# Patient Record
Sex: MALE | Race: WHITE | NOT HISPANIC OR LATINO | URBAN - METROPOLITAN AREA
[De-identification: names, ages, dates, MRNs, and addresses within clinical notes are randomized per-mention and may not be internally consistent; named-entity substitution may affect disease eponyms.]

---

## 2018-05-12 ENCOUNTER — EMERGENCY (EMERGENCY)
Facility: HOSPITAL | Age: 83
LOS: 1 days | Discharge: HOME | End: 2018-05-12
Attending: EMERGENCY MEDICINE | Admitting: EMERGENCY MEDICINE
Payer: COMMERCIAL

## 2018-05-12 VITALS
DIASTOLIC BLOOD PRESSURE: 90 MMHG | HEART RATE: 95 BPM | SYSTOLIC BLOOD PRESSURE: 143 MMHG | RESPIRATION RATE: 16 BRPM | OXYGEN SATURATION: 100 %

## 2018-05-12 VITALS
OXYGEN SATURATION: 98 % | RESPIRATION RATE: 16 BRPM | HEART RATE: 81 BPM | SYSTOLIC BLOOD PRESSURE: 144 MMHG | DIASTOLIC BLOOD PRESSURE: 81 MMHG | TEMPERATURE: 99 F

## 2018-05-12 DIAGNOSIS — M25.561 PAIN IN RIGHT KNEE: ICD-10-CM

## 2018-05-12 DIAGNOSIS — E78.5 HYPERLIPIDEMIA, UNSPECIFIED: ICD-10-CM

## 2018-05-12 DIAGNOSIS — E78.00 PURE HYPERCHOLESTEROLEMIA, UNSPECIFIED: ICD-10-CM
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
## 2018-05-12 DIAGNOSIS — M54.2 CERVICALGIA: ICD-10-CM

## 2018-05-12 DIAGNOSIS — I10 ESSENTIAL (PRIMARY) HYPERTENSION: ICD-10-CM
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
## 2018-05-12 DIAGNOSIS — M25.562 PAIN IN LEFT KNEE: ICD-10-CM

## 2018-05-12 LAB
ALBUMIN SERPL ELPH-MCNC: 2.3 G/DL — LOW (ref 3.4–5)
ALP SERPL-CCNC: 105 U/L — SIGNIFICANT CHANGE UP (ref 40–120)
ALT FLD-CCNC: 30 U/L — SIGNIFICANT CHANGE UP (ref 12–42)
ANION GAP SERPL CALC-SCNC: 11 MMOL/L — SIGNIFICANT CHANGE UP (ref 9–16)
APPEARANCE UR: CLEAR — SIGNIFICANT CHANGE UP
APTT BLD: 34 SEC — SIGNIFICANT CHANGE UP (ref 27.5–36.5)
AST SERPL-CCNC: 21 U/L — SIGNIFICANT CHANGE UP (ref 15–37)
BASOPHILS NFR BLD AUTO: 0.3 % — SIGNIFICANT CHANGE UP (ref 0–2)
BILIRUB SERPL-MCNC: 0.8 MG/DL — SIGNIFICANT CHANGE UP (ref 0.2–1.2)
BILIRUB UR-MCNC: (no result)
BUN SERPL-MCNC: 17 MG/DL — SIGNIFICANT CHANGE UP (ref 7–23)
CALCIUM SERPL-MCNC: 9.8 MG/DL — SIGNIFICANT CHANGE UP (ref 8.5–10.5)
CHLORIDE SERPL-SCNC: 104 MMOL/L — SIGNIFICANT CHANGE UP (ref 96–108)
CO2 SERPL-SCNC: 26 MMOL/L — SIGNIFICANT CHANGE UP (ref 22–31)
COLOR SPEC: YELLOW — SIGNIFICANT CHANGE UP
CREAT SERPL-MCNC: 1.25 MG/DL — SIGNIFICANT CHANGE UP (ref 0.5–1.3)
DIFF PNL FLD: NEGATIVE — SIGNIFICANT CHANGE UP
EOSINOPHIL NFR BLD AUTO: 0.3 % — SIGNIFICANT CHANGE UP (ref 0–6)
GLUCOSE SERPL-MCNC: 134 MG/DL — HIGH (ref 70–99)
GLUCOSE UR QL: NEGATIVE — SIGNIFICANT CHANGE UP
HCT VFR BLD CALC: 38.1 % — LOW (ref 39–50)
HGB BLD-MCNC: 12.8 G/DL — LOW (ref 13–17)
IMM GRANULOCYTES NFR BLD AUTO: 0.7 % — SIGNIFICANT CHANGE UP (ref 0–1.5)
INR BLD: 1.25 — HIGH (ref 0.88–1.16)
KETONES UR-MCNC: 40 MG/DL
LACTATE SERPL-SCNC: 1.7 MMOL/L — SIGNIFICANT CHANGE UP (ref 0.4–2)
LEUKOCYTE ESTERASE UR-ACNC: NEGATIVE — SIGNIFICANT CHANGE UP
LYMPHOCYTES # BLD AUTO: 6.1 % — LOW (ref 13–44)
MAGNESIUM SERPL-MCNC: 2.1 MG/DL — SIGNIFICANT CHANGE UP (ref 1.6–2.6)
MCHC RBC-ENTMCNC: 29.8 PG — SIGNIFICANT CHANGE UP (ref 27–34)
MCHC RBC-ENTMCNC: 33.6 G/DL — SIGNIFICANT CHANGE UP (ref 32–36)
MCV RBC AUTO: 88.8 FL — SIGNIFICANT CHANGE UP (ref 80–100)
MONOCYTES NFR BLD AUTO: 7.5 % — SIGNIFICANT CHANGE UP (ref 2–14)
NEUTROPHILS NFR BLD AUTO: 85.1 % — HIGH (ref 43–77)
NITRITE UR-MCNC: NEGATIVE — SIGNIFICANT CHANGE UP
NT-PROBNP SERPL-SCNC: 2870 PG/ML — HIGH
PH UR: 6 — SIGNIFICANT CHANGE UP (ref 5–8)
PLATELET # BLD AUTO: 394 K/UL — SIGNIFICANT CHANGE UP (ref 150–400)
POTASSIUM SERPL-MCNC: 3.5 MMOL/L — SIGNIFICANT CHANGE UP (ref 3.5–5.3)
POTASSIUM SERPL-SCNC: 3.5 MMOL/L — SIGNIFICANT CHANGE UP (ref 3.5–5.3)
PROT SERPL-MCNC: 7.7 G/DL — SIGNIFICANT CHANGE UP (ref 6.4–8.2)
PROT UR-MCNC: 30 MG/DL
PROTHROM AB SERPL-ACNC: 13.8 SEC — HIGH (ref 9.8–12.7)
RBC # BLD: 4.29 M/UL — SIGNIFICANT CHANGE UP (ref 4.2–5.8)
RBC # FLD: 12.1 % — SIGNIFICANT CHANGE UP (ref 10.3–16.9)
SODIUM SERPL-SCNC: 141 MMOL/L — SIGNIFICANT CHANGE UP (ref 132–145)
SP GR SPEC: 1.02 — SIGNIFICANT CHANGE UP (ref 1–1.03)
TROPONIN I SERPL-MCNC: <0.017 NG/ML — LOW (ref 0.02–0.06)
TSH SERPL-MCNC: 1.14 UIU/ML — SIGNIFICANT CHANGE UP (ref 0.36–3.74)
UROBILINOGEN FLD QL: 4 E.U./DL
WBC # BLD: 13.8 K/UL — HIGH (ref 3.8–10.5)
WBC # FLD AUTO: 13.8 K/UL — HIGH (ref 3.8–10.5)

## 2018-05-12 PROCEDURE — 72125 CT NECK SPINE W/O DYE: CPT | Mod: 26

## 2018-05-12 PROCEDURE — 73521 X-RAY EXAM HIPS BI 2 VIEWS: CPT | Mod: 26

## 2018-05-12 PROCEDURE — 70450 CT HEAD/BRAIN W/O DYE: CPT | Mod: 26

## 2018-05-12 PROCEDURE — 71045 X-RAY EXAM CHEST 1 VIEW: CPT | Mod: 26

## 2018-05-12 PROCEDURE — 73501 X-RAY EXAM HIP UNI 1 VIEW: CPT | Mod: 26,RT

## 2018-05-12 PROCEDURE — 73562 X-RAY EXAM OF KNEE 3: CPT | Mod: 26,RT

## 2018-05-12 PROCEDURE — 99284 EMERGENCY DEPT VISIT MOD MDM: CPT | Mod: 25

## 2018-05-12 PROCEDURE — 73502 X-RAY EXAM HIP UNI 2-3 VIEWS: CPT | Mod: 26,LT

## 2018-05-12 PROCEDURE — 73564 X-RAY EXAM KNEE 4 OR MORE: CPT | Mod: 26,50

## 2018-05-12 RX ORDER — ACETAMINOPHEN 500 MG
975 TABLET ORAL ONCE
Qty: 0 | Refills: 0 | Status: COMPLETED | OUTPATIENT
Start: 2018-05-12 | End: 2018-05-12

## 2018-05-12 RX ORDER — MORPHINE SULFATE 50 MG/1
2 CAPSULE, EXTENDED RELEASE ORAL ONCE
Qty: 0 | Refills: 0 | Status: DISCONTINUED | OUTPATIENT
Start: 2018-05-12 | End: 2018-05-12

## 2018-05-12 NOTE — ED PROVIDER NOTE - PROGRESS NOTE DETAILS
Labs wnl, xray and CT imaging wnl, feeling better, will d/c. Ketones and low albumin discussed with pt's daughter and . tejy are closely followed at [facility]. Will d/c with cane. Patient can ambulate and get in/out of chair on his own. No pain meds required.

## 2018-05-12 NOTE — ED PROVIDER NOTE - OBJECTIVE STATEMENT
84 m with HTN, HLD, worsening dementia NOS here with mechanical fall on we stairs this am. Slipped on outside steps, was on the ground outside int eh rain for 1h, couldn't get up. He lives with his daughter, has one flight of circular stairs to climb to ge to his room/bathroom. His gait has been increasingly unsteady and somnolent x 1 month. This is his first real fall. + b/l knee and hip pain.     His PMD/neuro MD have taken him demential meds as there was a concern that this was causing excess sleepiness. Also barely eating. Has some help at home a few times a week for chores but not for ADLs like bathing.

## 2018-05-12 NOTE — ED PROVIDER NOTE - FAMILY HISTORY
Mother  Still living? No  Family history of stroke, Age at diagnosis: Age Unknown     Father  Still living? No  CAD (coronary atherosclerotic disease), Age at diagnosis: Age Unknown

## 2018-05-12 NOTE — ED PROVIDER NOTE - NEUROLOGICAL, MLM
Alert and oriented to self/place. CRAWFORD, Sleepy, arousable, at time animated. Appears at or close to baseline per daughter.

## 2018-05-12 NOTE — ED PROVIDER NOTE - MEDICAL DECISION MAKING DETAILS
[facility]/knee [facility] b/l. Dn seem to have hit head bit did endorse sa neck pain on ROS. Will check labs with CT head/C-spine and hip/knee imaging.

## 2018-05-12 NOTE — ED PROVIDER NOTE - MUSCULOSKELETAL, MLM
Spine appears normal, b/l L > R hip area pain and b/l knee ttp with slight redness. Exam limited 2/2 dementia and ability to verbalized focal complaints

## 2018-05-12 NOTE — ED PROVIDER NOTE - PMH
Dementia without behavioral disturbance, unspecified dementia type    Hypercholesterolemia    Hypertension

## 2018-05-12 NOTE — ED PROVIDER NOTE - DIAGNOSTIC INTERPRETATION
Interpreted by ED Physician:  Hip L and pelvis xray (3 view)- no fx, no jt effusion, no soft tissue swelling, + DJD  Interpreted by ED Physician:  Hip R xray (2 view)- no fx, no jt effusion, no soft tissue swelling, + DJD  Interpreted by ED Physician:  Knee b/l xray (4 view)- no fx, no jt effusion, no soft tissue swelling, + DJD

## 2018-05-25 ENCOUNTER — EMERGENCY (EMERGENCY)
Facility: HOSPITAL | Age: 83
LOS: 1 days | Discharge: HOME | End: 2018-05-25
Admitting: EMERGENCY MEDICINE
Payer: MEDICARE

## 2018-05-25 VITALS
DIASTOLIC BLOOD PRESSURE: 79 MMHG | SYSTOLIC BLOOD PRESSURE: 106 MMHG | RESPIRATION RATE: 18 BRPM | TEMPERATURE: 99 F | OXYGEN SATURATION: 98 % | HEART RATE: 63 BPM

## 2018-05-25 VITALS
TEMPERATURE: 98 F | RESPIRATION RATE: 18 BRPM | HEART RATE: 62 BPM | SYSTOLIC BLOOD PRESSURE: 105 MMHG | DIASTOLIC BLOOD PRESSURE: 69 MMHG | OXYGEN SATURATION: 96 %

## 2018-05-25 DIAGNOSIS — Z79.899 OTHER LONG TERM (CURRENT) DRUG THERAPY: ICD-10-CM

## 2018-05-25 DIAGNOSIS — R53.1 WEAKNESS: ICD-10-CM

## 2018-05-25 DIAGNOSIS — E78.00 PURE HYPERCHOLESTEROLEMIA, UNSPECIFIED: ICD-10-CM

## 2018-05-25 DIAGNOSIS — M79.1 MYALGIA: ICD-10-CM

## 2018-05-25 DIAGNOSIS — I10 ESSENTIAL (PRIMARY) HYPERTENSION: ICD-10-CM

## 2018-05-25 PROBLEM — F03.90 UNSPECIFIED DEMENTIA WITHOUT BEHAVIORAL DISTURBANCE: Chronic | Status: ACTIVE | Noted: 2018-05-12

## 2018-05-25 LAB
ALBUMIN SERPL ELPH-MCNC: 2.1 G/DL — LOW (ref 3.4–5)
ALP SERPL-CCNC: 127 U/L — HIGH (ref 40–120)
ALT FLD-CCNC: 95 U/L — HIGH (ref 12–42)
ANION GAP SERPL CALC-SCNC: 7 MMOL/L — LOW (ref 9–16)
APPEARANCE UR: CLEAR — SIGNIFICANT CHANGE UP
AST SERPL-CCNC: 60 U/L — HIGH (ref 15–37)
BASOPHILS NFR BLD AUTO: 0.6 % — SIGNIFICANT CHANGE UP (ref 0–2)
BILIRUB SERPL-MCNC: 0.3 MG/DL — SIGNIFICANT CHANGE UP (ref 0.2–1.2)
BILIRUB UR-MCNC: NEGATIVE — SIGNIFICANT CHANGE UP
BUN SERPL-MCNC: 30 MG/DL — HIGH (ref 7–23)
CALCIUM SERPL-MCNC: 9.6 MG/DL — SIGNIFICANT CHANGE UP (ref 8.5–10.5)
CHLORIDE SERPL-SCNC: 103 MMOL/L — SIGNIFICANT CHANGE UP (ref 96–108)
CK SERPL-CCNC: 20 U/L — LOW (ref 39–308)
CO2 SERPL-SCNC: 29 MMOL/L — SIGNIFICANT CHANGE UP (ref 22–31)
COLOR SPEC: YELLOW — SIGNIFICANT CHANGE UP
CREAT SERPL-MCNC: 1.25 MG/DL — SIGNIFICANT CHANGE UP (ref 0.5–1.3)
DIFF PNL FLD: (no result)
EOSINOPHIL NFR BLD AUTO: 1.7 % — SIGNIFICANT CHANGE UP (ref 0–6)
GLUCOSE SERPL-MCNC: 125 MG/DL — HIGH (ref 70–99)
GLUCOSE UR QL: NEGATIVE — SIGNIFICANT CHANGE UP
HCT VFR BLD CALC: 38 % — LOW (ref 39–50)
HGB BLD-MCNC: 12.2 G/DL — LOW (ref 13–17)
IMM GRANULOCYTES NFR BLD AUTO: 0.5 % — SIGNIFICANT CHANGE UP (ref 0–1.5)
INR BLD: 1.22 — HIGH (ref 0.88–1.16)
KETONES UR-MCNC: NEGATIVE — SIGNIFICANT CHANGE UP
LACTATE SERPL-SCNC: 1.3 MMOL/L — SIGNIFICANT CHANGE UP (ref 0.4–2)
LEUKOCYTE ESTERASE UR-ACNC: NEGATIVE — SIGNIFICANT CHANGE UP
LYMPHOCYTES # BLD AUTO: 14.4 % — SIGNIFICANT CHANGE UP (ref 13–44)
MAGNESIUM SERPL-MCNC: 2.6 MG/DL — SIGNIFICANT CHANGE UP (ref 1.6–2.6)
MCHC RBC-ENTMCNC: 29.1 PG — SIGNIFICANT CHANGE UP (ref 27–34)
MCHC RBC-ENTMCNC: 32.1 G/DL — SIGNIFICANT CHANGE UP (ref 32–36)
MCV RBC AUTO: 90.7 FL — SIGNIFICANT CHANGE UP (ref 80–100)
MONOCYTES NFR BLD AUTO: 9.6 % — SIGNIFICANT CHANGE UP (ref 2–14)
NEUTROPHILS NFR BLD AUTO: 73.2 % — SIGNIFICANT CHANGE UP (ref 43–77)
NITRITE UR-MCNC: NEGATIVE — SIGNIFICANT CHANGE UP
NT-PROBNP SERPL-SCNC: 1802 PG/ML — HIGH
PH UR: 6.5 — SIGNIFICANT CHANGE UP (ref 5–8)
PLATELET # BLD AUTO: 421 K/UL — HIGH (ref 150–400)
POTASSIUM SERPL-MCNC: 4.6 MMOL/L — SIGNIFICANT CHANGE UP (ref 3.5–5.3)
POTASSIUM SERPL-SCNC: 4.6 MMOL/L — SIGNIFICANT CHANGE UP (ref 3.5–5.3)
PROT SERPL-MCNC: 7.9 G/DL — SIGNIFICANT CHANGE UP (ref 6.4–8.2)
PROT UR-MCNC: (no result) MG/DL
PROTHROM AB SERPL-ACNC: 13.5 SEC — HIGH (ref 9.8–12.7)
RBC # BLD: 4.19 M/UL — LOW (ref 4.2–5.8)
RBC # FLD: 12.4 % — SIGNIFICANT CHANGE UP (ref 10.3–16.9)
SODIUM SERPL-SCNC: 139 MMOL/L — SIGNIFICANT CHANGE UP (ref 132–145)
SP GR SPEC: 1.01 — SIGNIFICANT CHANGE UP (ref 1–1.03)
TROPONIN I SERPL-MCNC: <0.017 NG/ML — LOW (ref 0.02–0.06)
TSH SERPL-MCNC: 2.44 UIU/ML — SIGNIFICANT CHANGE UP (ref 0.36–3.74)
UROBILINOGEN FLD QL: 0.2 E.U./DL — SIGNIFICANT CHANGE UP
WBC # BLD: 10.7 K/UL — HIGH (ref 3.8–10.5)
WBC # FLD AUTO: 10.7 K/UL — HIGH (ref 3.8–10.5)

## 2018-05-25 PROCEDURE — 71045 X-RAY EXAM CHEST 1 VIEW: CPT | Mod: 26

## 2018-05-25 PROCEDURE — 71046 X-RAY EXAM CHEST 2 VIEWS: CPT | Mod: 26

## 2018-05-25 PROCEDURE — 99285 EMERGENCY DEPT VISIT HI MDM: CPT

## 2018-05-25 RX ORDER — SODIUM CHLORIDE 9 MG/ML
3 INJECTION INTRAMUSCULAR; INTRAVENOUS; SUBCUTANEOUS ONCE
Qty: 0 | Refills: 0 | Status: COMPLETED | OUTPATIENT
Start: 2018-05-25 | End: 2018-05-25

## 2018-05-25 RX ORDER — SODIUM CHLORIDE 9 MG/ML
1000 INJECTION INTRAMUSCULAR; INTRAVENOUS; SUBCUTANEOUS ONCE
Qty: 0 | Refills: 0 | Status: COMPLETED | OUTPATIENT
Start: 2018-05-25 | End: 2018-05-25

## 2018-05-25 RX ADMIN — SODIUM CHLORIDE 1000 MILLILITER(S): 9 INJECTION INTRAMUSCULAR; INTRAVENOUS; SUBCUTANEOUS at 19:19

## 2018-05-25 NOTE — ED PROVIDER NOTE - PSYCHIATRIC, MLM
Alert and oriented to person, place, time, disoriented to situation. normal mood and affect. no apparent risk to self or others.

## 2018-05-25 NOTE — ED PROVIDER NOTE - NEUROLOGICAL LEVEL OF CONSCIOUSNESS
alert, oriented x 3, follows commands.  pt disoriented to circumstance, moves all 4 extremities independently

## 2018-05-25 NOTE — ED ADULT TRIAGE NOTE - CHIEF COMPLAINT QUOTE
Pt. presents with weakness and back pain. As per daughter pt has been declining in health since a fall 2 weeks ago. Pt. refusing to eat, leave the house, or ambulate. Daughter states their  is in the process of trying to find him placement in an assisted living.

## 2018-05-25 NOTE — ED PROVIDER NOTE - OBJECTIVE STATEMENT
85 y/o M w/ PMH HTN, high cholesterol, Dementia, BIB EMS accompanied by daughter and care manager, who presents w/ generalized weakness x2 weeks. According to the daughter, patient is normally able to walk the dog, go grocery shopping and to the senior center 2-3 weeks ago, but over the past couple of weeks, he has progressively declined in health, becoming too weak to get up and do normal activities. He has close follow up at [facility] and had his dose of donepezil and Namenda discontinued. Daughter states the weakness has progressively worsened with decrease eating/drinking, and patient is refusing to get up out of bed. Today, patient had passed stool twice while in bed and did not care to get up and clean himself but just laid in bed covered in feces. Daughter states he did have a mechanical fall 2 weeks ago after slipping on wet stairs and the patient has been afraid to leave the house, fearful he will fall again. Denies fever, chills, chest pain, shortness of breath, abdominal pain, nausea, vomiting, diarrhea, headache, dizziness, or any other complaints. 85 y/o M w/ PMH HTN, high cholesterol, Dementia, BIB EMS accompanied by daughter and care manager, who presents w/ generalized weakness x2 weeks. According to the daughter, patient is normally able to walk the dog, go grocery shopping and to the senior center 2-3 weeks ago, but over the past couple of weeks, he has progressively declined in health, becoming too weak to get up and do normal activities. He has close follow up at [facility] and had his dose of donepezil and Namenda discontinued. Daughter states the weakness has progressively worsened with decrease eating/drinking, and patient is refusing to get up out of bed. Today, patient had passed stool twice while in bed and did not care to get up and clean himself but just laid in bed covered in feces. Daughter states he did have a mechanical fall 2 weeks ago after slipping on wet stairs and the patient has been afraid to leave the house, fearful he will fall again. Denies fever, chills, chest pain, shortness of breath, abdominal pain, nausea, vomiting, diarrhea, headache, dizziness, or any other complaints.    After further questioning, daughter states he has history of poor hygiene and "peculiar" behavior for as long as she can remember.  She describes he would often not shower or change his clothing for a month at a time and often leaves food at his bedside to decay/rot.  His tendencies became more pronounced after her mother  approx 2-3 years ago.

## 2018-05-31 LAB
CULTURE RESULTS: SIGNIFICANT CHANGE UP
SPECIMEN SOURCE: SIGNIFICANT CHANGE UP

## 2018-06-07 ENCOUNTER — INPATIENT (INPATIENT)
Facility: HOSPITAL | Age: 83
LOS: 4 days | Discharge: NURSING, REHAB OR LONG-TERM CARE | DRG: 56 | End: 2018-06-12
Attending: STUDENT IN AN ORGANIZED HEALTH CARE EDUCATION/TRAINING PROGRAM | Admitting: STUDENT IN AN ORGANIZED HEALTH CARE EDUCATION/TRAINING PROGRAM
Payer: MEDICARE

## 2018-06-07 VITALS
DIASTOLIC BLOOD PRESSURE: 81 MMHG | RESPIRATION RATE: 20 BRPM | TEMPERATURE: 98 F | SYSTOLIC BLOOD PRESSURE: 112 MMHG | OXYGEN SATURATION: 98 % | HEART RATE: 78 BPM

## 2018-06-07 LAB
ALBUMIN SERPL ELPH-MCNC: 1.9 G/DL — LOW (ref 3.4–5)
ALP SERPL-CCNC: 107 U/L — SIGNIFICANT CHANGE UP (ref 40–120)
ALT FLD-CCNC: 50 U/L — HIGH (ref 12–42)
ANION GAP SERPL CALC-SCNC: 7 MMOL/L — LOW (ref 9–16)
APPEARANCE UR: CLEAR — SIGNIFICANT CHANGE UP
AST SERPL-CCNC: 30 U/L — SIGNIFICANT CHANGE UP (ref 15–37)
BASOPHILS NFR BLD AUTO: 0.5 % — SIGNIFICANT CHANGE UP (ref 0–2)
BILIRUB SERPL-MCNC: 0.2 MG/DL — SIGNIFICANT CHANGE UP (ref 0.2–1.2)
BILIRUB UR-MCNC: NEGATIVE — SIGNIFICANT CHANGE UP
BUN SERPL-MCNC: 29 MG/DL — HIGH (ref 7–23)
CALCIUM SERPL-MCNC: 9.1 MG/DL — SIGNIFICANT CHANGE UP (ref 8.5–10.5)
CHLORIDE SERPL-SCNC: 102 MMOL/L — SIGNIFICANT CHANGE UP (ref 96–108)
CO2 SERPL-SCNC: 27 MMOL/L — SIGNIFICANT CHANGE UP (ref 22–31)
COLOR SPEC: YELLOW — SIGNIFICANT CHANGE UP
CREAT SERPL-MCNC: 1.23 MG/DL — SIGNIFICANT CHANGE UP (ref 0.5–1.3)
DIFF PNL FLD: ABNORMAL
EOSINOPHIL NFR BLD AUTO: 3 % — SIGNIFICANT CHANGE UP (ref 0–6)
GLUCOSE SERPL-MCNC: 170 MG/DL — HIGH (ref 70–99)
GLUCOSE UR QL: NEGATIVE — SIGNIFICANT CHANGE UP
HCT VFR BLD CALC: 35.8 % — LOW (ref 39–50)
HGB BLD-MCNC: 11.5 G/DL — LOW (ref 13–17)
IMM GRANULOCYTES NFR BLD AUTO: 0.7 % — SIGNIFICANT CHANGE UP (ref 0–1.5)
KETONES UR-MCNC: NEGATIVE — SIGNIFICANT CHANGE UP
LACTATE SERPL-SCNC: 1.8 MMOL/L — SIGNIFICANT CHANGE UP (ref 0.4–2)
LEUKOCYTE ESTERASE UR-ACNC: NEGATIVE — SIGNIFICANT CHANGE UP
LYMPHOCYTES # BLD AUTO: 15.3 % — SIGNIFICANT CHANGE UP (ref 13–44)
MAGNESIUM SERPL-MCNC: 2.1 MG/DL — SIGNIFICANT CHANGE UP (ref 1.6–2.6)
MCHC RBC-ENTMCNC: 29.1 PG — SIGNIFICANT CHANGE UP (ref 27–34)
MCHC RBC-ENTMCNC: 32.1 G/DL — SIGNIFICANT CHANGE UP (ref 32–36)
MCV RBC AUTO: 90.6 FL — SIGNIFICANT CHANGE UP (ref 80–100)
MONOCYTES NFR BLD AUTO: 9.9 % — SIGNIFICANT CHANGE UP (ref 2–14)
NEUTROPHILS NFR BLD AUTO: 70.6 % — SIGNIFICANT CHANGE UP (ref 43–77)
NITRITE UR-MCNC: NEGATIVE — SIGNIFICANT CHANGE UP
PH UR: 6 — SIGNIFICANT CHANGE UP (ref 5–8)
PLATELET # BLD AUTO: 398 K/UL — SIGNIFICANT CHANGE UP (ref 150–400)
POTASSIUM SERPL-MCNC: 3.8 MMOL/L — SIGNIFICANT CHANGE UP (ref 3.5–5.3)
POTASSIUM SERPL-SCNC: 3.8 MMOL/L — SIGNIFICANT CHANGE UP (ref 3.5–5.3)
PROT SERPL-MCNC: 7.3 G/DL — SIGNIFICANT CHANGE UP (ref 6.4–8.2)
PROT UR-MCNC: NEGATIVE MG/DL — SIGNIFICANT CHANGE UP
RBC # BLD: 3.95 M/UL — LOW (ref 4.2–5.8)
RBC # FLD: 12.9 % — SIGNIFICANT CHANGE UP (ref 10.3–16.9)
SODIUM SERPL-SCNC: 136 MMOL/L — SIGNIFICANT CHANGE UP (ref 132–145)
SP GR SPEC: 1.02 — SIGNIFICANT CHANGE UP (ref 1–1.03)
TROPONIN I SERPL-MCNC: <0.017 NG/ML — LOW (ref 0.02–0.06)
UROBILINOGEN FLD QL: 0.2 E.U./DL — SIGNIFICANT CHANGE UP
WBC # BLD: 10.4 K/UL — SIGNIFICANT CHANGE UP (ref 3.8–10.5)
WBC # FLD AUTO: 10.4 K/UL — SIGNIFICANT CHANGE UP (ref 3.8–10.5)

## 2018-06-07 PROCEDURE — 71045 X-RAY EXAM CHEST 1 VIEW: CPT | Mod: 26

## 2018-06-07 PROCEDURE — 74177 CT ABD & PELVIS W/CONTRAST: CPT | Mod: 26

## 2018-06-07 PROCEDURE — 99223 1ST HOSP IP/OBS HIGH 75: CPT | Mod: GC

## 2018-06-07 PROCEDURE — 99285 EMERGENCY DEPT VISIT HI MDM: CPT

## 2018-06-07 PROCEDURE — 70450 CT HEAD/BRAIN W/O DYE: CPT | Mod: 26

## 2018-06-07 RX ORDER — SODIUM CHLORIDE 9 MG/ML
1000 INJECTION INTRAMUSCULAR; INTRAVENOUS; SUBCUTANEOUS
Qty: 0 | Refills: 0 | Status: DISCONTINUED | OUTPATIENT
Start: 2018-06-07 | End: 2018-06-07

## 2018-06-07 RX ORDER — SODIUM CHLORIDE 9 MG/ML
1000 INJECTION INTRAMUSCULAR; INTRAVENOUS; SUBCUTANEOUS
Qty: 0 | Refills: 0 | Status: DISCONTINUED | OUTPATIENT
Start: 2018-06-07 | End: 2018-06-08

## 2018-06-07 RX ORDER — HEPARIN SODIUM 5000 [USP'U]/ML
5000 INJECTION INTRAVENOUS; SUBCUTANEOUS EVERY 8 HOURS
Qty: 0 | Refills: 0 | Status: DISCONTINUED | OUTPATIENT
Start: 2018-06-07 | End: 2018-06-08

## 2018-06-07 RX ADMIN — SODIUM CHLORIDE 150 MILLILITER(S): 9 INJECTION INTRAMUSCULAR; INTRAVENOUS; SUBCUTANEOUS at 20:11

## 2018-06-07 NOTE — ED PROVIDER NOTE - MEDICAL DECISION MAKING DETAILS
85 y/o pt brought from home, hypotensive, 70/50 normal, tensive on arrival, seen in ED twice in the past 3 weeks, declining heatlh, failure to thrive and progressive weakness. Pt effectively bed bound for last week. 2 ED visits and has been seen by home , chronically no vital signs derangements, low grade fever, chronically ill appearing. Chest XR, blood work, labs, UA, CT head, EKG. 85 y/o pt brought from home, hypotensive, 70/50 normal, tensive on arrival, seen in ED twice in the past 3 weeks, declining health, failure to thrive and progressive weakness. Pt effectively bed bound for last week. 2 ED visits and has been seen by home , chronically no vital signs derangements, low grade fever, chronically ill appearing. Chest XR, blood work, labs, UA, CT head, EKG.  Hx of afib, HTN.  Accompanied by brother and sister in law

## 2018-06-07 NOTE — PATIENT PROFILE ADULT. - NSNUTRITIONRISK_GI_A_CORE
Unintentional weight loss greater than 10%/Significant decrease of oral intake greater than 5 days prior to admission

## 2018-06-07 NOTE — ED PROVIDER NOTE - NEUROLOGICAL, MLM
Alert and oriented, MAEx2, follows commands appropriately. No facial asymmetry. Alert and oriented, MAEx2, follows commands appropriately. No facial asymmetry.  Difficult to move and to asses truncal ataxia or gait.

## 2018-06-07 NOTE — ED ADULT NURSE REASSESSMENT NOTE - NS ED NURSE REASSESS COMMENT FT1
Pt received from ER [facility]. Pt is A&Ox1 at this time. Pt denies pain at this time and exhibiting no s.s of acute distress. Pt has romero in place draining dark yellow urine. Pt is pending transfer at this time. Pt stable at this time and will continue to monitor.

## 2018-06-07 NOTE — H&P ADULT - ATTENDING COMMENTS
Pt seen and examined at bedside on 6/7/2018 @ 2350    Agree with HPI, ROS as above.     VS, Labs, FH, SH, allergies, medications, imaging reviewed. I personally discussed this case with Dr. Burt ([facility] provider) - patient with multiple ED visits recently, unable to take care of self. I personally reviewed the CXR - no overt infiltrate or consolidation. Agree with physical exam as above     A/P: 83 y/o M PMHx HTN, HLD, dementia, presents with complaints of weakness, lethargy, progressive mental decline x1 month.    **Toxic Metabolic Encephalopathy  -Unclear etiology although likely 2/2 progressive decline within the setting of long standing dementia  -Differential includes Wernicke's given patient's extensive EtOH history - will tx empirically with high dose thiamine 500 q8 x 3 days  -No overt infectious etiology  -CT head with no acute intracranial pathology  -Neuro consult in AM    Plan otherwise as outlined above..... Pt seen and examined at bedside on 6/7/2018 @ 2350    Agree with HPI, ROS as above.     VS, Labs, FH, SH, allergies, medications, imaging reviewed. I personally discussed this case with Dr. Burt ([facility] provider) - patient with multiple ED visits recently, unable to take care of self. I personally reviewed the CXR - no overt infiltrate or consolidation. Agree with physical exam as above     A/P: 85 y/o M PMHx HTN, HLD, dementia, presents with complaints of weakness, lethargy, progressive mental decline x1 month.    **Toxic Metabolic Encephalopathy  -Unclear etiology although likely 2/2 progressive decline within the setting of long standing dementia  -Differential includes Wernicke's given patient's extensive EtOH history - will tx empirically with high dose thiamine 500 q8 x 3 days  -No overt infectious etiology  -CT head with no acute intracranial pathology  -Neuro consult in AM  -Check for reversible causes, TSH, b12, RPR    Plan otherwise as outlined above.....

## 2018-06-07 NOTE — ED PROVIDER NOTE - CARDIAC, MLM
Normal rate, regular rhythm.  Heart sounds S1, S2.  No murmurs, rubs or gallops. irregularly irregular,  No murmurs, rubs or gallops.

## 2018-06-07 NOTE — H&P ADULT - HISTORY OF PRESENT ILLNESS
HPI: 85 y/o M PMHx HTN, HLD, a.fib, dementia, presents with complaints of weakness. Per brother at bedside, patient has had dementia for 3 years and as of 1 year ago has been considerably more eccentric. 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 Patient has had 3 mechanical falls over the past 3 months, in January he slipped on ice and most recently 5/11 he slipped on the wet ground. He has previously been able to walk the dog, not really take care of himself in terms of ADLs, but his daughter lives 2 stories above him and patient lives on the 2nd floor of a walk-up. Since the most recent fall, patient has been afraid of walking outside in fear of falling, has been more odd and having "delusions" per the brother such as having a conversation then trailing off and not making sense. He has been increasingly agitated as well and has been difficult to re-orient but seems to respond to the brother. Patient has also had worsening PO over the past month and needs to be encouraged to eat or drink anything. He mostly sleeps all day. Lost 30lbs over 6 weeks. He has been stooling himself and not bothering to clean himself or ask for help. Per his  doctor and psychiatrist, this is most likely progression of vascular dementia. He has been worked up extensively outpatient and including in our ED for various etiologies. Difficult to obtain ROS as patient is tangential and nonsensical despite following some commands. Per brother, does not know home medications was previously on statin, also was on 2 medications (possibly donepezil/memantine that was stopped 1 week ago), med rec also lists valsartan.    ED vitals:  T(F): 98.9, Max: 99.7  HR: 87 (78 - 89)  BP: 134/75 (112/81 - 148/75)  RR: 18 (18 - 20)  SpO2: 97% (96% - 98%)    Labs Hgb 11.2, previously 12.8 -> 12.5. Cr 1.23. UA negative. Blood culture from 2 days ago normal.  CXR no infiltrates or effusions. CT abdomen/pelvis w. IV contrast without acute etiologies.  NS 150cc/hr HPI: 83 y/o M PMHx HTN, HLD, a.fib (unclear if new or old), dementia, presents with complaints of weakness. Per brother at bedside, patient has had dementia for 3 years and as of 1 year ago has been considerably more eccentric. 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 Patient has had 3 mechanical falls over the past 3 months, in January he slipped on ice and most recently 5/11 he slipped on the wet ground. He has previously been able to walk the dog, not really take care of himself in terms of ADLs, but his daughter lives 2 stories above him and patient lives on the 2nd floor of a walk-up. Since the most recent fall, patient has been afraid of walking outside in fear of falling, has been more odd and having "delusions" per the brother such as having a conversation then trailing off and not making sense. He has been increasingly agitated as well and has been difficult to re-orient but seems to respond to the brother. Patient has also had worsening PO over the past month and needs to be encouraged to eat or drink anything. He mostly sleeps all day. Lost 30lbs over 6 weeks. He has been stooling himself and not bothering to clean himself or ask for help. Per his  doctor and psychiatrist, this is most likely progression of vascular dementia. He has been worked up extensively outpatient and including in our ED for various etiologies. Difficult to obtain ROS as patient is tangential and nonsensical despite following some commands. Per brother, does not know home medications was previously on statin, also was on 2 medications (possibly donepezil/memantine that was stopped 1 week ago), med rec also lists valsartan.    ED vitals:  T(F): 98.9, Max: 99.7  HR: 87 (78 - 89)  BP: 134/75 (112/81 - 148/75)  RR: 18 (18 - 20)  SpO2: 97% (96% - 98%)    Labs Hgb 11.2, previously 12.8 -> 12.5. Cr 1.23. UA negative. Blood culture from 2 days ago normal.  CXR no infiltrates or effusions. CT abdomen/pelvis w. IV contrast without acute etiologies.  NS 150cc/hr HPI: 85 y/o M PMHx HTN, HLD, dementia, presents with complaints of weakness. Per brother at bedside, patient has had dementia for 3 years and as of 1 year ago has been considerably more eccentric. 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 Patient has had 3 mechanical falls over the past 3 months, in January he slipped on ice and most recently 5/11 he slipped on the wet ground. He has previously been able to walk the dog, not really take care of himself in terms of ADLs, but his daughter lives 2 stories above him and patient lives on the 2nd floor of a walk-up. Since the most recent fall, patient has been afraid of walking outside in fear of falling, has been more odd and having "delusions" per the brother such as having a conversation then trailing off and not making sense. He has been increasingly agitated as well and has been difficult to re-orient but seems to respond to the brother. Patient has also had worsening PO over the past month and needs to be encouraged to eat or drink anything. He mostly sleeps all day. Lost 30lbs over 6 weeks. He has been stooling himself and not bothering to clean himself or ask for help. Per his  doctor and psychiatrist, this is most likely progression of vascular dementia. He has been worked up extensively outpatient and including in our ED for various etiologies. Difficult to obtain ROS as patient is tangential and nonsensical despite following some commands. Per brother, does not know home medications was previously on statin, also was on 2 medications (possibly donepezil/memantine that was stopped 1 week ago), med rec also lists valsartan.    ED vitals:  T(F): 98.9, Max: 99.7  HR: 87 (78 - 89)  BP: 134/75 (112/81 - 148/75)  RR: 18 (18 - 20)  SpO2: 97% (96% - 98%)    Labs Hgb 11.2, previously 12.8 -> 12.5. Cr 1.23. UA negative. Blood culture from 2 days ago normal.  CXR no infiltrates or effusions. CT abdomen/pelvis w. IV contrast without acute etiologies.  NS 150cc/hr HPI: 85 y/o M PMHx HTN, HLD, dementia, alcohol use disorder, presents with complaints of weakness. Per brother at bedside, patient has had dementia for 3 years and as of 1 year ago has been considerably more eccentric. 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 Patient has had 3 mechanical falls over the past 3 months, in January he slipped on ice and most recently 5/11 he slipped on the wet ground. He has previously been able to walk the dog, not really take care of himself in terms of ADLs, but his daughter lives 2 stories above him and patient lives on the 2nd floor of a walk-up. Since the most recent fall, patient has been afraid of walking outside in fear of falling, has been more odd and having "delusions" per the brother such as having a conversation then trailing off and not making sense. He has been increasingly agitated as well and has been difficult to re-orient but seems to respond to the brother. Patient has also had worsening PO over the past month and needs to be encouraged to eat or drink anything. He mostly sleeps all day. Lost 30lbs over 6 weeks. He has been stooling himself and not bothering to clean himself or ask for help. Pt used to drink heavily per the brother, last drink was 3 weeks ago. Per his  doctor and psychiatrist, this is most likely progression of vascular dementia. He has been worked up extensively outpatient and including in our ED for various etiologies. Difficult to obtain ROS as patient is tangential and nonsensical despite following some commands. Per brother, does not know home medications was previously on statin, also was on 2 medications (possibly donepezil/memantine that was stopped 1 week ago), med rec also lists valsartan.    ED vitals:  T(F): 98.9, Max: 99.7  HR: 87 (78 - 89)  BP: 134/75 (112/81 - 148/75)  RR: 18 (18 - 20)  SpO2: 97% (96% - 98%)    Labs Hgb 11.2, previously 12.8 -> 12.5. Cr 1.23. UA negative. Blood culture from 2 days ago normal.  CXR no infiltrates or effusions. CT abdomen/pelvis w. IV contrast without acute etiologies.  NS 150cc/hr HPI: 83 y/o M PMHx HTN, HLD, dementia, presents with complaints of weakness. Per brother at bedside, patient has had dementia for 3 years and as of 1 year ago has been considerably more eccentric. 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 Patient has had 3 mechanical falls over the past 3 months, in January he slipped on ice and most recently 5/11 he slipped on the wet ground. He has previously been able to walk the dog, not really take care of himself in terms of ADLs, but his daughter lives 2 stories above him and patient lives on the 2nd floor of a walk-up. Since the most recent fall, patient has been afraid of walking outside in fear of falling, has been more odd per the brother such as having a conversation then trailing off and not making sense. He has been increasingly agitated as well and has been difficult to re-orient but seems to respond to the brother. Patient has also had worsening PO over the past month and needs to be encouraged to eat or drink anything. He mostly sleeps all day. Lost 30lbs over 6 weeks. He has been stooling himself and not bothering to clean himself or ask for help. Pt used to drink heavily per the brother, last drink was 3 weeks ago. Per his  doctor and psychiatrist, this is most likely progression of vascular dementia. He has been worked up extensively outpatient and including in our ED for various etiologies. Difficult to obtain ROS as patient is tangential and nonsensical despite following some commands. Per brother, does not know home medications was previously on statin, also was on 2 medications (possibly donepezil/memantine that was stopped 1 week ago), med rec also lists valsartan.    ED vitals:  T(F): 98.9, Max: 99.7  HR: 87 (78 - 89)  BP: 134/75 (112/81 - 148/75)  RR: 18 (18 - 20)  SpO2: 97% (96% - 98%)    Labs Hgb 11.2, previously 12.8 -> 12.5. Cr 1.23. UA negative. Blood culture from 2 days ago normal.  CXR no infiltrates or effusions. CT abdomen/pelvis w. IV contrast without acute etiologies.  NS 150cc/hr

## 2018-06-07 NOTE — ED PROVIDER NOTE - GASTROINTESTINAL, MLM
abd umbilical hernia slightly discolor, soft but non tender, unable to fully reduce, LLQ tenderness, no distension or guarding. umbilical hernia slightly discolored, soft but non tender, unable to fully reduce, LLQ tenderness, no distension or guarding.

## 2018-06-07 NOTE — H&P ADULT - ASSESSMENT
85 y/o M PMHx HTN, HLD, a.fib, dementia, presents with complaints of weakness, lethargy, progressive mental decline x1 month. 85 y/o M PMHx HTN, HLD, dementia, presents with complaints of weakness, lethargy, progressive mental decline x1 month.

## 2018-06-07 NOTE — H&P ADULT - NSHPSOCIALHISTORY_GEN_ALL_CORE
Previous smoker. Previously heavy alcohol drinker, last drink 3 weeks ago per brother. No illicit drug use. Previous smoker. Previously heavy alcohol drinker, last drink 3 weeks ago per brother, but no longer drinking for many months. No illicit drug use.

## 2018-06-07 NOTE — ED PROVIDER NOTE - PROGRESS NOTE DETAILS
Reviewed labs, imaging, urine.  NO evidence of infection.  Cultures pending.  CT brain unchanged.  CT abdomen - no evidence of bowel obstruction or bowel containing hernia.  Hernia with fat, no bowel.  Mental status unchanged.  No evidence of infection in urine or chest.  Lytes unremarkable.  ON maintenance fluids.  Will admit for failure to thrive, will likely need placement.  Discussed in detail with family.  Spoke with HUY and Dr. Land.  Admit under Dr. Gambino.  Hx of afib without RVR.

## 2018-06-07 NOTE — ED PROVIDER NOTE - CONSTITUTIONAL, MLM
normal... Chronically ill appearing, moaning in pain. Chronically ill appearing, moaning in pain, deconditioned

## 2018-06-07 NOTE — ED PROVIDER NOTE - OBJECTIVE STATEMENT
83 y/o M CUBA presents to ED w/ c/o weakness and lethargy. Pt has been in this ER x2 in past 3 weeks for failure to thrive, deep conditioning and declining health. Reviewed ED visits from may 12th and may 25th, blood culture negative, CT scan of brain moderate short vessel disease. Pt accompanied by brother and sister in law, brother is power of . Pts PCP is bre Torres dr, spoke w/ her, states she has only known pt for a few weeks, few home visits, states that recent testing of urine, blood, and XR were all within normal limits. Pt also seen by psychiatrist Alexis Huddleston. 85 y/o M CUBA presents to ED w/ c/o weakness and lethargy. Pt has been in this ER x2 in past 3 weeks for failure to thrive, deep conditioning and declining health. Reviewed ED visits from may 12th and may 25th, blood culture negative, CT scan of brain moderate short vessel disease. Pt accompanied by brother and sister in law, brother is power of . Pts PCP is Dr Shea Goodrich, bre [facility], spoke w/ her, states she has only known pt for a few weeks, few home visits, states that recent testing of urine, blood, and XR were all within normal limits. Pt also seen by psychiatrist Alexis Huddleston..  Hx of Afib

## 2018-06-07 NOTE — ED ADULT TRIAGE NOTE - CHIEF COMPLAINT QUOTE
Pt complaining of fall one week ago and now family states patient is weak. Pt has appointment with psychiatrist at [facility] for 4pm today

## 2018-06-07 NOTE — ED ADULT NURSE NOTE - CHIEF COMPLAINT QUOTE
Pt complaining of fall one week ago and now family states patient is weak. Pt has appointment with psychiatrist at [facility] for 4pm today

## 2018-06-07 NOTE — ED PROVIDER NOTE - CROS ED SKIN ALL NEG
LM for pt to call back. Why does he need refill of nystatin?    negative... radiology results/need for outpatient follow-up

## 2018-06-07 NOTE — ED ADULT NURSE NOTE - OBJECTIVE STATEMENT
2-3 wks completely bedridden; hx of dementia; has been delusional; hasn't been eating; oriented to self and place; psychiatrist wants a full neurological workup at [facility]; called 911; was going to be taken to [facility] but in the ambulance pressure became 70/40; pt is an 84 year old male who comes was [facility] for medical evaluation. pt accompanied by brother who states "for 2-3 wks he has been completely bedridden; he has dementia and has been delusional, hasn't been eating and his psychiatrist wants a full neurological workup at [facility]; we called 911 for him to be taken to [facility] but in the ambulance pressure became 70/40 so they brought him here". pt is alert and oriented to self and place. pt reports generalized pain. EKG was done, IV placed, labs sent. pt family denies nausea, vomiting, diarrhea, fevers, chills. pt family reports increasing confusion and lethargy.

## 2018-06-07 NOTE — H&P ADULT - NSHPPHYSICALEXAM_GEN_ALL_CORE
VITAL SIGNS:  T(F): 98.9 (06-07-18 @ 22:57)  HR: 87 (06-07-18 @ 22:57)  BP: 134/75 (06-07-18 @ 22:57)  RR: 18 (06-07-18 @ 22:57)  SpO2: 97% (06-07-18 @ 22:57)  Wt(kg): --    PHYSICAL EXAM:    Constitutional: well-appearing, well-developed, mildly dry appearing, NAD  Eyes: PERRL, EOMI, no nystagmus  ENMT: dry MM  Neck: supple  Respiratory: CTAb/l, no wheezes/rales/rhonchi  Cardiovascular: irregular rhythm, regular rate, +3/6 systolic murmur at the LUSB,   Gastrointestinal: soft, NTND, BS normal, +umbilical hernia that is reducible  Extremities: WWP no edema or cyanosis  Vascular: DP 2+ b/l  Neurological: AAOx2, CNII-XII grossly intact, Strength 3+/5 b/l hand , 4/5 b/l biceps, 3/5 b/l triceps; 3+/5 b/l hip flexors, 4/5 b/l leg flexors or extensors, 5/5 b/l dorsi and plantar flexion. Sensation normal to light touch b/l. Reflexes brachial 2+ b/l, brachioradialis 1+ b/l, patellar 2+ b/l, achilles 1+ b/l, plantars withdraws b/l  Musculoskeletal: VITAL SIGNS:  T(F): 98.9 (06-07-18 @ 22:57)  HR: 87 (06-07-18 @ 22:57)  BP: 134/75 (06-07-18 @ 22:57)  RR: 18 (06-07-18 @ 22:57)  SpO2: 97% (06-07-18 @ 22:57)  Wt(kg): --    PHYSICAL EXAM:    Constitutional: well-appearing, well-developed, mildly dry appearing, NAD  Eyes: PERRL, EOMI, no nystagmus  ENMT: dry MM  Neck: supple  Respiratory: CTAb/l, no wheezes/rales/rhonchi  Cardiovascular: irregular rhythm, regular rate, +3/6 systolic murmur at the LUSB,   Gastrointestinal: soft, NTND, BS normal, +umbilical hernia that is reducible  Rectal: brown stool, rectal tone difficult to assess as not sure if patient is following commands  Extremities: WWP no edema or cyanosis  Vascular: DP 2+ b/l  Back: non-tender, no stepoffs  Neurological: AAOx2, Follows most commands. CNII-XII grossly intact, Strength 3+/5 b/l hand , 4/5 b/l biceps, 3/5 b/l triceps; 3+/5 b/l hip flexors, 4/5 b/l leg flexors or extensors, 5/5 b/l dorsi and plantar flexion. Sensation normal to light touch b/l. Reflexes brachial 2+ b/l, brachioradialis 1+ b/l, patellar 2+ b/l, achilles 1+ b/l, plantars withdraws b/l  Musculoskeletal:

## 2018-06-07 NOTE — H&P ADULT - PROBLEM SELECTOR PLAN 3
Progressive. Likely vascular given history and CT head findings. Progressive. Likely vascular given history and CT head findings. Recently stopped donepezil/memantine.

## 2018-06-07 NOTE — H&P ADULT - PROBLEM SELECTOR PLAN 5
Rate controlled. Not on A/C, fall risk, no anticoagulation at this time. Rate controlled. Unclear if new or old. Not on A/C, also a fall risk, no anticoagulation at this time.  - Echo for embolic source HSQ q8hrs

## 2018-06-07 NOTE — H&P ADULT - PROBLEM SELECTOR PLAN 4
Normotensive currently. Brother did not know medications. Ordered for valsartan per notes.  - needs med rec

## 2018-06-07 NOTE — H&P ADULT - NSHPLABSRESULTS_GEN_ALL_CORE
LABS:                        11.5   10.4  )-----------( 398      ( 2018 16:18 )             35.8         136  |  102  |  29<H>  ----------------------------<  170<H>  3.8   |  27  |  1.23    Ca    9.1      2018 16:18  Mg     2.1         TPro  7.3  /  Alb  1.9<L>  /  TBili  0.2  /  DBili  x   /  AST  30  /  ALT  50<H>  /  AlkPhos  107        Urinalysis Basic - ( 2018 19:44 )    Color: Yellow / Appearance: Clear / S.020 / pH: x  Gluc: x / Ketone: NEGATIVE  / Bili: NEGATIVE / Urobili: 0.2 E.U./dL   Blood: x / Protein: NEGATIVE mg/dL / Nitrite: NEGATIVE   Leuk Esterase: NEGATIVE / RBC: < 5 /HPF / WBC < 5 /HPF   Sq Epi: x / Non Sq Epi: Few /HPF / Bacteria: Present /HPF        RADIOLOGY & ADDITIONAL TESTS:  < from: CT Head No Cont (18 @ 18:31) >    FINDINGS:     There is no acute intracranial hemorrhage, mass effect, midline shift or   extra axial collections. The gray white differentiation appears grossly   preserved without evidence for an acute transcortical infarction.     There is unchanged mild diffuse pregnant volume loss. Also unchanged is   patchy multifocal lucency throughout the cerebral white matter, likely   consequence of long-standing small vessel ischemic disease, moderate in   degree. Few small lacunar infarctions in the deep gray nuclei are similar   as well.    The bony windows demonstrates no fractures. The included paranasal   sinuses and mastoid air cells are predominantly clear.    IMPRESSION:     No acute intracranial abnormality. Specifically, no acute intracranial   hemorrhage, mass effect or recent transcortical or territorial   infarction.     Parenchymal volume loss and chronic ischemic changes, unchanged compared   to 2018.    < from: CT Abdomen and Pelvis w/ IV Cont (18 @ 18:35) >    IMPRESSION:  Limited study due to patient's position and motion. Patient's arms cover   the umbilical region.     9 cm fat-containing umbilical hernia. No bowel obstruction or free air.    Severe calcification of the aortic valve. Findings can be seen in aortic   valvular stenosis.    Simple and complicated renal cyst. Recommend renal ultrasound to exclude   solid lesion.      < end of copied text > LABS:                        11.5   10.4  )-----------( 398      ( 2018 16:18 )             35.8         136  |  102  |  29<H>  ----------------------------<  170<H>  3.8   |  27  |  1.23    Ca    9.1      2018 16:18  Mg     2.1         TPro  7.3  /  Alb  1.9<L>  /  TBili  0.2  /  DBili  x   /  AST  30  /  ALT  50<H>  /  AlkPhos  107        Urinalysis Basic - ( 2018 19:44 )    Color: Yellow / Appearance: Clear / S.020 / pH: x  Gluc: x / Ketone: NEGATIVE  / Bili: NEGATIVE / Urobili: 0.2 E.U./dL   Blood: x / Protein: NEGATIVE mg/dL / Nitrite: NEGATIVE   Leuk Esterase: NEGATIVE / RBC: < 5 /HPF / WBC < 5 /HPF   Sq Epi: x / Non Sq Epi: Few /HPF / Bacteria: Present /HPF        RADIOLOGY & ADDITIONAL TESTS:  < from: CT Head No Cont (18 @ 18:31) >    FINDINGS:     There is no acute intracranial hemorrhage, mass effect, midline shift or   extra axial collections. The gray white differentiation appears grossly   preserved without evidence for an acute transcortical infarction.     There is unchanged mild diffuse pregnant volume loss. Also unchanged is   patchy multifocal lucency throughout the cerebral white matter, likely   consequence of long-standing small vessel ischemic disease, moderate in   degree. Few small lacunar infarctions in the deep gray nuclei are similar   as well.    The bony windows demonstrates no fractures. The included paranasal   sinuses and mastoid air cells are predominantly clear.    IMPRESSION:     No acute intracranial abnormality. Specifically, no acute intracranial   hemorrhage, mass effect or recent transcortical or territorial   infarction.     Parenchymal volume loss and chronic ischemic changes, unchanged compared   to 2018.    < from: CT Abdomen and Pelvis w/ IV Cont (18 @ 18:35) >    IMPRESSION:  Limited study due to patient's position and motion. Patient's arms cover   the umbilical region.     9 cm fat-containing umbilical hernia. No bowel obstruction or free air.    Severe calcification of the aortic valve. Findings can be seen in aortic   valvular stenosis.    Simple and complicated renal cyst. Recommend renal ultrasound to exclude   solid lesion.      < end of copied text >    EKG NSR, Mobitz Type 1.  EKG from  NSR with PACs

## 2018-06-07 NOTE — H&P ADULT - PROBLEM SELECTOR PLAN 1
Progressive mental decline, poor PO, lethargy, weight loss. CT head showing patchy multifocal lucency throughout the cerebral white matter, consistent with possible worsening vascular dementia. Also in DDX is malignancy. Hgb mildly decreased from few weeks ago.  - Nutrition consult in am  - Per family, patient had MRI outpatient, Neuro consult in am  - c/w NS 100cc/hr  - f/u B12, folate, TSH Progressive mental decline, poor PO, lethargy, weight loss. CT head showing patchy multifocal lucency throughout the cerebral white matter, consistent with possible worsening vascular dementia. Pt also w/ a.fib unclear if new or old, not on A/C, may be having multiple embolic CVAs as well. Also in DDX is malignancy. Hgb mildly decreased from few weeks ago.  - Per family, patient had MRI outpatient 1 year ago, follow up records; may warrant repeat MRI brain and neuro consult in am  - Echocardiogram to eval for thrombus  - Nutrition consult in am  - c/w NS 100cc/hr  - f/u B12, folate, TSH Progressive mental decline, poor PO, lethargy, weight loss. CT head showing patchy multifocal lucency throughout the cerebral white matter, consistent with possible worsening vascular dementia. Also in DDX is malignancy. Hgb mildly decreased from few weeks ago.  - Per family, patient had MRI outpatient 1 year ago, follow up records; may warrant repeat MRI brain and neuro consult in am  - Nutrition consult in am  - c/w NS 100cc/hr  - f/u B12, folate, TSH Progressive mental decline, poor PO, lethargy, weight loss. CT head showing patchy multifocal lucency throughout the cerebral white matter, consistent with possible worsening vascular dementia. Hx of heavy alcohol use- Possible wernicke's. Also in DDX is malignancy. Hgb mildly decreased from few weeks ago.  - Per family, patient had MRI outpatient 1 year ago, follow up records; may warrant repeat MRI brain and neuro consult in am  - Treat empirically for Wernicke's Encephalopathy- thiamine 500mg IV  - Nutrition consult in am  - c/w NS 100cc/hr  - f/u B12, folate, TSH, RPR  - 1 Banana bag now Progressive mental decline, poor PO, lethargy, weight loss. CT head showing patchy multifocal lucency throughout the cerebral white matter, consistent with possible worsening vascular dementia. Hx of heavy alcohol use- Possible wernicke's. Also in DDX is malignancy. Hgb mildly decreased from few weeks ago. No infectious etiology at this time.  - Per family, patient had MRI outpatient 1 year ago, follow up records; may warrant repeat MRI brain and neuro consult in am  - Treat empirically for Wernicke's Encephalopathy- thiamine 500mg IV  - Nutrition consult in am  - c/w NS 100cc/hr  - f/u B12, folate, TSH, RPR  - 1 Banana bag now Progressive mental decline, poor PO, lethargy, weight loss. CT head showing patchy multifocal lucency throughout the cerebral white matter, consistent with possible worsening vascular dementia. Previous hx of heavy alcohol use- Possible Wernicke's, but last drink was many weeks to months ago. Also in DDX is malignancy. Hgb mildly decreased from few weeks ago. No infectious etiology at this time.  - Per family, patient had MRI outpatient 1 year ago, follow up records; may warrant repeat MRI brain and neuro consult in am  - Treat empirically for Wernicke's Encephalopathy- thiamine 500mg IV; will need more collateral from PMD in am regarding alcohol history  - Nutrition consult in am  - c/w NS 100cc/hr  - f/u B12, folate, TSH, RPR  - 1 Banana bag now

## 2018-06-08 DIAGNOSIS — I10 ESSENTIAL (PRIMARY) HYPERTENSION: ICD-10-CM

## 2018-06-08 DIAGNOSIS — Z29.9 ENCOUNTER FOR PROPHYLACTIC MEASURES, UNSPECIFIED: ICD-10-CM

## 2018-06-08 DIAGNOSIS — I35.0 NONRHEUMATIC AORTIC (VALVE) STENOSIS: ICD-10-CM

## 2018-06-08 DIAGNOSIS — I48.91 UNSPECIFIED ATRIAL FIBRILLATION: ICD-10-CM

## 2018-06-08 DIAGNOSIS — R53.1 WEAKNESS: ICD-10-CM

## 2018-06-08 DIAGNOSIS — E78.00 PURE HYPERCHOLESTEROLEMIA, UNSPECIFIED: ICD-10-CM

## 2018-06-08 DIAGNOSIS — F03.90 UNSPECIFIED DEMENTIA WITHOUT BEHAVIORAL DISTURBANCE: ICD-10-CM

## 2018-06-08 DIAGNOSIS — R62.7 ADULT FAILURE TO THRIVE: ICD-10-CM

## 2018-06-08 DIAGNOSIS — E43 UNSPECIFIED SEVERE PROTEIN-CALORIE MALNUTRITION: ICD-10-CM

## 2018-06-08 DIAGNOSIS — D64.9 ANEMIA, UNSPECIFIED: ICD-10-CM

## 2018-06-08 DIAGNOSIS — R63.8 OTHER SYMPTOMS AND SIGNS CONCERNING FOOD AND FLUID INTAKE: ICD-10-CM

## 2018-06-08 LAB
ANION GAP SERPL CALC-SCNC: 11 MMOL/L — SIGNIFICANT CHANGE UP (ref 5–17)
BASOPHILS NFR BLD AUTO: 0.1 % — SIGNIFICANT CHANGE UP (ref 0–2)
BUN SERPL-MCNC: 21 MG/DL — SIGNIFICANT CHANGE UP (ref 7–23)
CALCIUM SERPL-MCNC: 9 MG/DL — SIGNIFICANT CHANGE UP (ref 8.4–10.5)
CHLORIDE SERPL-SCNC: 101 MMOL/L — SIGNIFICANT CHANGE UP (ref 96–108)
CO2 SERPL-SCNC: 26 MMOL/L — SIGNIFICANT CHANGE UP (ref 22–31)
CREAT SERPL-MCNC: 0.91 MG/DL — SIGNIFICANT CHANGE UP (ref 0.5–1.3)
EOSINOPHIL NFR BLD AUTO: 0.4 % — SIGNIFICANT CHANGE UP (ref 0–6)
FERRITIN SERPL-MCNC: 730 NG/ML — HIGH (ref 30–400)
FOLATE SERPL-MCNC: >20 NG/ML — SIGNIFICANT CHANGE UP
GLUCOSE SERPL-MCNC: 133 MG/DL — HIGH (ref 70–99)
HCT VFR BLD CALC: 33.4 % — LOW (ref 39–50)
HGB BLD-MCNC: 11 G/DL — LOW (ref 13–17)
IRON SATN MFR SERPL: 16 % — SIGNIFICANT CHANGE UP (ref 16–55)
IRON SATN MFR SERPL: 23 UG/DL — LOW (ref 45–165)
LYMPHOCYTES # BLD AUTO: 9.1 % — LOW (ref 13–44)
MAGNESIUM SERPL-MCNC: 2.1 MG/DL — SIGNIFICANT CHANGE UP (ref 1.6–2.6)
MCHC RBC-ENTMCNC: 28.8 PG — SIGNIFICANT CHANGE UP (ref 27–34)
MCHC RBC-ENTMCNC: 32.9 G/DL — SIGNIFICANT CHANGE UP (ref 32–36)
MCV RBC AUTO: 87.4 FL — SIGNIFICANT CHANGE UP (ref 80–100)
MONOCYTES NFR BLD AUTO: 7.5 % — SIGNIFICANT CHANGE UP (ref 2–14)
NEUTROPHILS NFR BLD AUTO: 82.9 % — HIGH (ref 43–77)
PHOSPHATE SERPL-MCNC: 3.1 MG/DL — SIGNIFICANT CHANGE UP (ref 2.5–4.5)
PLATELET # BLD AUTO: 374 K/UL — SIGNIFICANT CHANGE UP (ref 150–400)
POTASSIUM SERPL-MCNC: 3.7 MMOL/L — SIGNIFICANT CHANGE UP (ref 3.5–5.3)
POTASSIUM SERPL-SCNC: 3.7 MMOL/L — SIGNIFICANT CHANGE UP (ref 3.5–5.3)
RBC # BLD: 3.82 M/UL — LOW (ref 4.2–5.8)
RBC # FLD: 13.5 % — SIGNIFICANT CHANGE UP (ref 10.3–16.9)
SODIUM SERPL-SCNC: 138 MMOL/L — SIGNIFICANT CHANGE UP (ref 135–145)
T PALLIDUM AB TITR SER: NEGATIVE — SIGNIFICANT CHANGE UP
TIBC SERPL-MCNC: 144 UG/DL — LOW (ref 220–430)
TRANSFERRIN SERPL-MCNC: 116 MG/DL — LOW (ref 200–360)
TSH SERPL-MCNC: 1.89 UIU/ML — SIGNIFICANT CHANGE UP (ref 0.35–4.94)
UIBC SERPL-MCNC: 121 UG/DL — SIGNIFICANT CHANGE UP (ref 110–370)
VIT B12 SERPL-MCNC: 573 PG/ML — SIGNIFICANT CHANGE UP (ref 232–1245)
WBC # BLD: 12.7 K/UL — HIGH (ref 3.8–10.5)
WBC # FLD AUTO: 12.7 K/UL — HIGH (ref 3.8–10.5)

## 2018-06-08 PROCEDURE — 93306 TTE W/DOPPLER COMPLETE: CPT | Mod: 26

## 2018-06-08 PROCEDURE — 99233 SBSQ HOSP IP/OBS HIGH 50: CPT | Mod: GC

## 2018-06-08 PROCEDURE — 99223 1ST HOSP IP/OBS HIGH 75: CPT

## 2018-06-08 PROCEDURE — 93010 ELECTROCARDIOGRAM REPORT: CPT

## 2018-06-08 RX ORDER — SODIUM CHLORIDE 9 MG/ML
1000 INJECTION, SOLUTION INTRAVENOUS
Qty: 0 | Refills: 0 | Status: COMPLETED | OUTPATIENT
Start: 2018-06-08 | End: 2018-06-08

## 2018-06-08 RX ORDER — HEPARIN SODIUM 5000 [USP'U]/ML
5000 INJECTION INTRAVENOUS; SUBCUTANEOUS EVERY 8 HOURS
Qty: 0 | Refills: 0 | Status: DISCONTINUED | OUTPATIENT
Start: 2018-06-08 | End: 2018-06-12

## 2018-06-08 RX ORDER — THIAMINE MONONITRATE (VIT B1) 100 MG
400 TABLET ORAL ONCE
Qty: 0 | Refills: 0 | Status: COMPLETED | OUTPATIENT
Start: 2018-06-08 | End: 2018-06-08

## 2018-06-08 RX ORDER — THIAMINE MONONITRATE (VIT B1) 100 MG
500 TABLET ORAL EVERY 8 HOURS
Qty: 0 | Refills: 0 | Status: DISCONTINUED | OUTPATIENT
Start: 2018-06-08 | End: 2018-06-11

## 2018-06-08 RX ORDER — FOLIC ACID 0.8 MG
1 TABLET ORAL DAILY
Qty: 0 | Refills: 0 | Status: DISCONTINUED | OUTPATIENT
Start: 2018-06-08 | End: 2018-06-12

## 2018-06-08 RX ADMIN — Medication 104 MILLIGRAM(S): at 02:34

## 2018-06-08 RX ADMIN — SODIUM CHLORIDE 100 MILLILITER(S): 9 INJECTION, SOLUTION INTRAVENOUS at 02:34

## 2018-06-08 RX ADMIN — HEPARIN SODIUM 5000 UNIT(S): 5000 INJECTION INTRAVENOUS; SUBCUTANEOUS at 11:06

## 2018-06-08 RX ADMIN — HEPARIN SODIUM 5000 UNIT(S): 5000 INJECTION INTRAVENOUS; SUBCUTANEOUS at 18:08

## 2018-06-08 RX ADMIN — HEPARIN SODIUM 5000 UNIT(S): 5000 INJECTION INTRAVENOUS; SUBCUTANEOUS at 01:13

## 2018-06-08 RX ADMIN — Medication 105 MILLIGRAM(S): at 20:36

## 2018-06-08 RX ADMIN — Medication 105 MILLIGRAM(S): at 11:07

## 2018-06-08 RX ADMIN — Medication 1 MILLIGRAM(S): at 14:38

## 2018-06-08 RX ADMIN — Medication 1 TABLET(S): at 14:39

## 2018-06-08 NOTE — CHART NOTE - NSCHARTNOTEFT_GEN_A_CORE
Upon Nutritional Assessment by the Registered Dietitian your patient was determined to meet criteria / has evidence of the following diagnosis/diagnoses:          [ ]  Mild Protein Calorie Malnutrition        [x ]  Moderate Protein Calorie Malnutrition        [ ] Severe Protein Calorie Malnutrition        [ ] Unspecified Protein Calorie Malnutrition        [ ] Underweight / BMI <19        [ ] Morbid Obesity / BMI > 40      Findings as based on:  •  Comprehensive nutrition assessment and consultation  •  Calorie counts (nutrient intake analysis)  •  Food acceptance and intake status from observations by staff  •  Follow up  •  Patient education  •  Intervention secondary to interdisciplinary rounds  •   concerns    21lb wt loss in 6 weeks (7.5% wt loss).   prolonged inadequate intake <75% for >1 month.     Treatment:    The following diet has been recommended:  1) Continue w/ EnsureEnlive TID  2) Thiamine, folate, B12  3) Appreciate support at meal times    PROVIDER Section:     By signing this assessment you are acknowledging and agree with the diagnosis/diagnoses assigned by the Registered Dietitian    Comments:

## 2018-06-08 NOTE — PHYSICAL THERAPY INITIAL EVALUATION ADULT - PATIENT/FAMILY/SIGNIFICANT OTHER GOALS STATEMENT, PT EVAL
Family currently filling out applications for medicaid stating pt. does not have any savings. Agreeable to rehab post hospital stay.

## 2018-06-08 NOTE — PHYSICAL THERAPY INITIAL EVALUATION ADULT - CRITERIA FOR SKILLED THERAPEUTIC INTERVENTIONS
impairments found/risk reduction/prevention/therapy frequency/functional limitations in following categories/anticipated discharge recommendation/rehab potential/anticipated equipment needs at discharge

## 2018-06-08 NOTE — PROGRESS NOTE ADULT - PROBLEM SELECTOR PLAN 3
Mild Cognitive Decline, but patient well studied and during interview took increasing time to arrive at answers  -Most likely underlying dementia given CTH findings of atrophic brain  -R/O reversible causes as above Patient with normocytic anemia  -Iron labs consistent with anemia of chronic disease however patient without any signs  -As per admitting team patient without signs of hematochezia/melena during rectal  -Patient with small amount of blood in [place] this AM could be 2/2   -Will continue to trend CBC

## 2018-06-08 NOTE — PHYSICAL THERAPY INITIAL EVALUATION ADULT - PERTINENT HX OF CURRENT PROBLEM, REHAB EVAL
85 y/o M admitted for failure to thrive and complaints of weakness. PMHx includes dementia, HTN, HLD, and alcohol use disorder.

## 2018-06-08 NOTE — PHYSICAL THERAPY INITIAL EVALUATION ADULT - IMPAIRMENTS FOUND, PT EVAL
cognitive impairment/muscle strength/posture/aerobic capacity/endurance/poor safety awareness/ROM/arousal, attention, and cognition/gait, locomotion, and balance

## 2018-06-08 NOTE — PHYSICAL THERAPY INITIAL EVALUATION ADULT - PHYSICAL ASSIST/NONPHYSICAL ASSIST: GAIT, REHAB EVAL
verbal cues/1 person for chair follow/set-up required/1 person assist/1 person + 1 person to manage equipment

## 2018-06-08 NOTE — DIETITIAN INITIAL EVALUATION ADULT. - PROBLEM SELECTOR PLAN 1
Progressive mental decline, poor PO, lethargy, weight loss. CT head showing patchy multifocal lucency throughout the cerebral white matter, consistent with possible worsening vascular dementia. Hx of heavy alcohol use- Possible wernicke's. Also in DDX is malignancy. Hgb mildly decreased from few weeks ago. No infectious etiology at this time.  - Per family, patient had MRI outpatient 1 year ago, follow up records; may warrant repeat MRI brain and neuro consult in am  - Treat empirically for Wernicke's Encephalopathy- thiamine 500mg IV  - Nutrition consult in am  - c/w NS 100cc/hr  - f/u B12, folate, TSH, RPR  - 1 Banana bag now

## 2018-06-08 NOTE — DIETITIAN INITIAL EVALUATION ADULT. - ENERGY NEEDS
Height: 5'5" Weight: 159lbs, IBW 136lbs+/-10%, %%, BMI 26.5  IBW used for calculations as pt >120% of IBW   Nutrient needs based on [facility] standards of care for maintenance in older adults.  needs adjusted for suspected protein calorie malnutrition

## 2018-06-08 NOTE — DIETITIAN INITIAL EVALUATION ADULT. - NS AS NUTRI INTERV ED CONTENT
Discussed w/ family importance of nutrient dense smoothies/snacks as pt requires constant reorientation to meals. Discussed meal planning and optimal ingredients./Purpose of the nutrition education

## 2018-06-08 NOTE — PHYSICAL THERAPY INITIAL EVALUATION ADULT - ADDITIONAL COMMENTS
Pt. lives alone in 2 story walk-up +26 y/o daughter lives 2 flights up +stairs to enter +unilateral handrail. Fell for the third time recently and has since been afraid to leave [facility]. Family states pt. soils bed, sleeps all day, has experienced significant weight loss, and daughter inadequate caretaker. Up until 3 weeks ago pt. independent with all bed mobilities, transfers, ADL's, and community ambulation without assistive device.

## 2018-06-08 NOTE — PROGRESS NOTE ADULT - SUBJECTIVE AND OBJECTIVE BOX
O/N Events: RANJIT  Subjective/ROS: Patient has no complaints. Denies HA, CP, SOB, n/v, changes in bowel/urinary habits.  12pt ROS otherwise negative.    VITALS  Vital Signs Last 24 Hrs  T(C): 37.9 (2018 05:23), Max: 37.9 (2018 05:23)  T(F): 100.2 (2018 05:23), Max: 100.2 (2018 05:23)  HR: 69 (2018 07:28) (69 - 93)  BP: 138/88 (2018 07:28) (112/81 - 169/76)  BP(mean): --  RR: 18 (2018 05:23) (18 - 20)  SpO2: 95% (2018 05:23) (95% - 98%)    CAPILLARY BLOOD GLUCOSE    PHYSICAL EXAM  General: Elderly male, unkept facial hair, lying in bed  Head: NC/AT; MMM; PERRL; EOMI;  Neck: Supple; no JVD  Respiratory: CTA B/L; no wheezes/crackles   Cardiovascular: Regular rhythm/rate; S1/S2; 3/6 Systolic Murmur RUSB   Gastrointestinal: Soft; NTND; normoactive BS  Extremities: WWP; trace pedal edema.   Neurological:  AAOx2, knows year but not month/day. Strength 3/5 in bilateral upper extremities, 3/5 RLE, 4/5 LLE  Psych   -Orientation to time: able to name year and season   -Orientation to place: able to name state, county, city   -Registration: able to remember 3 words   -Cognition: able to spell world backwards   -Memory: unable to remember previous 3 words   -Language: able to identify objects, repeat phrase, follow both verbal and written command, write a sentance   -Copying: able to copy shapes      MEDICATIONS  (STANDING):  folic acid 1 milliGRAM(s) Oral daily  heparin  Injectable 5000 Unit(s) SubCutaneous every 8 hours  multivitamin 1 Tablet(s) Oral daily  thiamine IVPB 500 milliGRAM(s) IV Intermittent every 8 hours    MEDICATIONS  (PRN):      No Known Allergies      LABS                        11.0   12.7  )-----------( 374      ( 2018 07:38 )             33.4     06-07    136  |  102  |  29<H>  ----------------------------<  170<H>  3.8   |  27  |  1.23    Ca    9.1      2018 16:18  Mg     2.1         TPro  7.3  /  Alb  1.9<L>  /  TBili  0.2  /  DBili  x   /  AST  30  /  ALT  50<H>  /  AlkPhos  107        Urinalysis Basic - ( 2018 19:44 )    Color: Yellow / Appearance: Clear / S.020 / pH: x  Gluc: x / Ketone: NEGATIVE  / Bili: NEGATIVE / Urobili: 0.2 E.U./dL   Blood: x / Protein: NEGATIVE mg/dL / Nitrite: NEGATIVE   Leuk Esterase: NEGATIVE / RBC: < 5 /HPF / WBC < 5 /HPF   Sq Epi: x / Non Sq Epi: Few /HPF / Bacteria: Present /HPF      CARDIAC MARKERS ( 2018 16:18 )  <0.017 ng/mL / x     / x     / x     / x            IMAGING/EKG/ETC  < from: CT Head No Cont (18 @ 18:31) >  No acute intracranial abnormality. Specifically, no acute intracranial   hemorrhage, mass effect or recent transcortical or territorial   infarction.     Parenchymal volume loss and chronic ischemic changes, unchanged compared   to 2018.    < end of copied text >    < from: CT Abdomen and Pelvis w/ IV Cont (18 @ 18:35) >  Limited study due to patient's position and motion. Patient's arms cover   the umbilical region.     9 cm fat-containing umbilical hernia. No bowel obstruction or free air.    Severe calcification of the aortic valve. Findings can be seen in aortic   valvular stenosis.    Simple and complicated renal cyst. Recommend renal ultrasound to exclude   solid lesion.    < end of copied text >

## 2018-06-08 NOTE — DIETITIAN INITIAL EVALUATION ADULT. - PROBLEM SELECTOR PLAN 3
Progressive. Likely vascular given history and CT head findings. Recently stopped donepezil/memantine.

## 2018-06-08 NOTE — PROGRESS NOTE ADULT - PROBLEM SELECTOR PLAN 2
Patient with normocytic anemia  -Iron labs consistent with anemia of chronic disease however patient without any signs  -As per admitting team patient without signs of hematochezia/melena during rectal  -Patient with small amount of blood in [place] this AM could be 2/2   -Will continue to trend CBC Mild Cognitive Decline, but patient well studied and during interview took increasing time to arrive at answers. Extensive work up as an outpatient most likely a component of vascular and Alzheimer's  -Most likely underlying dementia given CTH findings of atrophic brain  -R/O reversible causes as above

## 2018-06-08 NOTE — PHYSICAL THERAPY INITIAL EVALUATION ADULT - GAIT DEVIATIONS NOTED, PT EVAL
decreased step length/decreased stride length/decreased weight-shifting ability/decreased dee/increased time in double stance

## 2018-06-08 NOTE — PROGRESS NOTE ADULT - PROBLEM SELECTOR PLAN 7
P: 5000U HSQ q8h  C: FULL CODE  D: Continue on RMF, disposition pending collateral from PMD and family, and PT evaluation

## 2018-06-08 NOTE — CONSULT NOTE ADULT - PROBLEM SELECTOR RECOMMENDATION 9
- Pt not a surgical candidate at this time.   -Would recommend full neurological w/u  -medical optimization                                    -Nutrition consult, nutritional supplementation  -PT consult.  -Plan discussed w family and pt at bedside

## 2018-06-09 LAB
CULTURE RESULTS: NO GROWTH — SIGNIFICANT CHANGE UP
HCT VFR BLD CALC: 33.6 % — LOW (ref 39–50)
HGB BLD-MCNC: 10.9 G/DL — LOW (ref 13–17)
MCHC RBC-ENTMCNC: 28.5 PG — SIGNIFICANT CHANGE UP (ref 27–34)
MCHC RBC-ENTMCNC: 32.4 G/DL — SIGNIFICANT CHANGE UP (ref 32–36)
MCV RBC AUTO: 87.7 FL — SIGNIFICANT CHANGE UP (ref 80–100)
PLATELET # BLD AUTO: 378 K/UL — SIGNIFICANT CHANGE UP (ref 150–400)
RBC # BLD: 3.83 M/UL — LOW (ref 4.2–5.8)
RBC # FLD: 13.5 % — SIGNIFICANT CHANGE UP (ref 10.3–16.9)
SPECIMEN SOURCE: SIGNIFICANT CHANGE UP
WBC # BLD: 10.9 K/UL — HIGH (ref 3.8–10.5)
WBC # FLD AUTO: 10.9 K/UL — HIGH (ref 3.8–10.5)

## 2018-06-09 PROCEDURE — 99233 SBSQ HOSP IP/OBS HIGH 50: CPT

## 2018-06-09 RX ADMIN — Medication 105 MILLIGRAM(S): at 10:16

## 2018-06-09 RX ADMIN — Medication 1 MILLIGRAM(S): at 11:19

## 2018-06-09 RX ADMIN — HEPARIN SODIUM 5000 UNIT(S): 5000 INJECTION INTRAVENOUS; SUBCUTANEOUS at 18:09

## 2018-06-09 RX ADMIN — Medication 105 MILLIGRAM(S): at 18:08

## 2018-06-09 RX ADMIN — Medication 105 MILLIGRAM(S): at 01:52

## 2018-06-09 RX ADMIN — HEPARIN SODIUM 5000 UNIT(S): 5000 INJECTION INTRAVENOUS; SUBCUTANEOUS at 01:53

## 2018-06-09 RX ADMIN — HEPARIN SODIUM 5000 UNIT(S): 5000 INJECTION INTRAVENOUS; SUBCUTANEOUS at 09:51

## 2018-06-09 RX ADMIN — Medication 1 TABLET(S): at 11:19

## 2018-06-09 NOTE — PROGRESS NOTE ADULT - SUBJECTIVE AND OBJECTIVE BOX
Pt reports "I'm fine".  Denies pain,  discomfort, malaise.     AFVSS  NAD in bed  mmm  RRR  clear lungs  soft abd  no edema    labs reviewed

## 2018-06-10 PROCEDURE — 99233 SBSQ HOSP IP/OBS HIGH 50: CPT | Mod: GC

## 2018-06-10 RX ADMIN — HEPARIN SODIUM 5000 UNIT(S): 5000 INJECTION INTRAVENOUS; SUBCUTANEOUS at 01:29

## 2018-06-10 RX ADMIN — HEPARIN SODIUM 5000 UNIT(S): 5000 INJECTION INTRAVENOUS; SUBCUTANEOUS at 09:31

## 2018-06-10 RX ADMIN — Medication 1 TABLET(S): at 11:19

## 2018-06-10 RX ADMIN — Medication 105 MILLIGRAM(S): at 01:29

## 2018-06-10 RX ADMIN — HEPARIN SODIUM 5000 UNIT(S): 5000 INJECTION INTRAVENOUS; SUBCUTANEOUS at 17:31

## 2018-06-10 RX ADMIN — Medication 105 MILLIGRAM(S): at 18:08

## 2018-06-10 RX ADMIN — Medication 1 MILLIGRAM(S): at 11:19

## 2018-06-10 RX ADMIN — Medication 105 MILLIGRAM(S): at 11:19

## 2018-06-10 NOTE — PROGRESS NOTE ADULT - PROBLEM SELECTOR PLAN 3
Patient with normocytic anemia  -Iron labs consistent with anemia of chronic disease however patient without any signs  -As per admitting team patient without signs of hematochezia/melena during rectal  -Patient with small amount of blood in [place] this AM could be 2/2   -Will continue to trend CBC

## 2018-06-10 NOTE — PROGRESS NOTE ADULT - PROBLEM SELECTOR PLAN 2
Mild Cognitive Decline, but patient well studied and during interview took increasing time to arrive at answers. Extensive work up as an outpatient most likely a component of vascular and Alzheimer's  -Most likely underlying dementia given CTH findings of atrophic brain  -R/O reversible causes as above

## 2018-06-10 NOTE — PROGRESS NOTE ADULT - SUBJECTIVE AND OBJECTIVE BOX
O/N Events: RANJIT  Subjective/ROS: Patient has no complaints. Denies HA, CP, SOB, n/v, changes in bowel/urinary habits.  12pt ROS otherwise negative.    VITALS  Vital Signs Last 24 Hrs  T(C): 37 (10 George 2018 09:41), Max: 37.7 (09 Jun 2018 20:29)  T(F): 98.6 (10 George 2018 09:41), Max: 99.8 (09 Jun 2018 20:29)  HR: 94 (10 George 2018 09:41) (92 - 99)  BP: 133/85 (10 George 2018 09:41) (133/85 - 163/95)  BP(mean): --  RR: 22 (10 George 2018 09:41) (18 - 22)  SpO2: 95% (10 George 2018 09:41) (94% - 97%)    CAPILLARY BLOOD GLUCOSE    PHYSICAL EXAM  General: A&Ox3; NAD  Head: NC/AT; MMM; PERRL; EOMI;  Neck: Supple; no JVD  Respiratory: CTA B/L; no wheezes/crackles   Cardiovascular: Regular rhythm/rate; S1/S2; 3/6 Systolic Murmur RUSB  Gastrointestinal: Soft; NTND; normoactive BS  Extremities: WWP; no edema/cyanosis  Neurological:  CNII-XII grossly intact; no obvious focal deficits    MEDICATIONS  (STANDING):  folic acid 1 milliGRAM(s) Oral daily  heparin  Injectable 5000 Unit(s) SubCutaneous every 8 hours  multivitamin 1 Tablet(s) Oral daily  thiamine IVPB 500 milliGRAM(s) IV Intermittent every 8 hours    MEDICATIONS  (PRN):      No Known Allergies      LABS                        10.9   10.9  )-----------( 378      ( 09 Jun 2018 08:09 )             33.6 O/N Events: RANJIT  Subjective/ROS: Patient has no complaints. Denies HA, CP, SOB, n/v, changes in bowel/urinary habits.  12pt ROS otherwise negative.    VITALS  Vital Signs Last 24 Hrs  T(C): 37 (10 George 2018 09:41), Max: 37.7 (09 Jun 2018 20:29)  T(F): 98.6 (10 George 2018 09:41), Max: 99.8 (09 Jun 2018 20:29)  HR: 94 (10 George 2018 09:41) (92 - 99)  BP: 133/85 (10 George 2018 09:41) (133/85 - 163/95)  BP(mean): --  RR: 22 (10 George 2018 09:41) (18 - 22)  SpO2: 95% (10 George 2018 09:41) (94% - 97%)    CAPILLARY BLOOD GLUCOSE    PHYSICAL EXAM  General: A&Ox2; NAD  Head: NC/AT; MMM; PERRL; EOMI;  Neck: Supple; no JVD  Respiratory: CTA B/L; no wheezes/crackles   Cardiovascular: Regular rhythm/rate; S1/S2; 3/6 Systolic Murmur RUSB  Gastrointestinal: Soft; NTND; normoactive BS  Extremities: WWP; no edema/cyanosis  Neurological:  CNII-XII grossly intact; no obvious focal deficits    MEDICATIONS  (STANDING):  folic acid 1 milliGRAM(s) Oral daily  heparin  Injectable 5000 Unit(s) SubCutaneous every 8 hours  multivitamin 1 Tablet(s) Oral daily  thiamine IVPB 500 milliGRAM(s) IV Intermittent every 8 hours    MEDICATIONS  (PRN):      No Known Allergies      LABS                        10.9   10.9  )-----------( 378      ( 09 Jun 2018 08:09 )             33.6

## 2018-06-11 DIAGNOSIS — I35.0 NONRHEUMATIC AORTIC (VALVE) STENOSIS: ICD-10-CM

## 2018-06-11 DIAGNOSIS — R50.9 FEVER, UNSPECIFIED: ICD-10-CM

## 2018-06-11 LAB
ANION GAP SERPL CALC-SCNC: 11 MMOL/L — SIGNIFICANT CHANGE UP (ref 5–17)
APPEARANCE UR: ABNORMAL
BASOPHILS NFR BLD AUTO: 0.3 % — SIGNIFICANT CHANGE UP (ref 0–2)
BILIRUB UR-MCNC: NEGATIVE — SIGNIFICANT CHANGE UP
BUN SERPL-MCNC: 27 MG/DL — HIGH (ref 7–23)
CALCIUM SERPL-MCNC: 9.2 MG/DL — SIGNIFICANT CHANGE UP (ref 8.4–10.5)
CHLORIDE SERPL-SCNC: 101 MMOL/L — SIGNIFICANT CHANGE UP (ref 96–108)
CO2 SERPL-SCNC: 27 MMOL/L — SIGNIFICANT CHANGE UP (ref 22–31)
COLOR SPEC: YELLOW — SIGNIFICANT CHANGE UP
CREAT SERPL-MCNC: 1.15 MG/DL — SIGNIFICANT CHANGE UP (ref 0.5–1.3)
DIFF PNL FLD: NEGATIVE — SIGNIFICANT CHANGE UP
EOSINOPHIL NFR BLD AUTO: 1.7 % — SIGNIFICANT CHANGE UP (ref 0–6)
GLUCOSE SERPL-MCNC: 126 MG/DL — HIGH (ref 70–99)
GLUCOSE UR QL: NEGATIVE — SIGNIFICANT CHANGE UP
HCT VFR BLD CALC: 32.8 % — LOW (ref 39–50)
HGB BLD-MCNC: 10.7 G/DL — LOW (ref 13–17)
KETONES UR-MCNC: NEGATIVE — SIGNIFICANT CHANGE UP
LEUKOCYTE ESTERASE UR-ACNC: NEGATIVE — SIGNIFICANT CHANGE UP
LYMPHOCYTES # BLD AUTO: 17.7 % — SIGNIFICANT CHANGE UP (ref 13–44)
MAGNESIUM SERPL-MCNC: 2.6 MG/DL — SIGNIFICANT CHANGE UP (ref 1.6–2.6)
MCHC RBC-ENTMCNC: 28.7 PG — SIGNIFICANT CHANGE UP (ref 27–34)
MCHC RBC-ENTMCNC: 32.6 G/DL — SIGNIFICANT CHANGE UP (ref 32–36)
MCV RBC AUTO: 87.9 FL — SIGNIFICANT CHANGE UP (ref 80–100)
MONOCYTES NFR BLD AUTO: 9.5 % — SIGNIFICANT CHANGE UP (ref 2–14)
NEUTROPHILS NFR BLD AUTO: 70.8 % — SIGNIFICANT CHANGE UP (ref 43–77)
NITRITE UR-MCNC: NEGATIVE — SIGNIFICANT CHANGE UP
PH UR: 7.5 — SIGNIFICANT CHANGE UP (ref 5–8)
PLATELET # BLD AUTO: 377 K/UL — SIGNIFICANT CHANGE UP (ref 150–400)
POTASSIUM SERPL-MCNC: 4 MMOL/L — SIGNIFICANT CHANGE UP (ref 3.5–5.3)
POTASSIUM SERPL-SCNC: 4 MMOL/L — SIGNIFICANT CHANGE UP (ref 3.5–5.3)
PROT UR-MCNC: NEGATIVE MG/DL — SIGNIFICANT CHANGE UP
RAPID RVP RESULT: SIGNIFICANT CHANGE UP
RBC # BLD: 3.73 M/UL — LOW (ref 4.2–5.8)
RBC # FLD: 13.7 % — SIGNIFICANT CHANGE UP (ref 10.3–16.9)
SODIUM SERPL-SCNC: 139 MMOL/L — SIGNIFICANT CHANGE UP (ref 135–145)
SP GR SPEC: 1.01 — SIGNIFICANT CHANGE UP (ref 1–1.03)
UROBILINOGEN FLD QL: 0.2 E.U./DL — SIGNIFICANT CHANGE UP
WBC # BLD: 11.7 K/UL — HIGH (ref 3.8–10.5)
WBC # FLD AUTO: 11.7 K/UL — HIGH (ref 3.8–10.5)

## 2018-06-11 PROCEDURE — 71045 X-RAY EXAM CHEST 1 VIEW: CPT | Mod: 26

## 2018-06-11 PROCEDURE — 99233 SBSQ HOSP IP/OBS HIGH 50: CPT | Mod: GC

## 2018-06-11 RX ORDER — ACETAMINOPHEN 500 MG
650 TABLET ORAL ONCE
Qty: 0 | Refills: 0 | Status: COMPLETED | OUTPATIENT
Start: 2018-06-11 | End: 2018-06-11

## 2018-06-11 RX ORDER — FOLIC ACID 0.8 MG
1 TABLET ORAL
Qty: 0 | Refills: 0 | COMMUNITY
Start: 2018-06-11

## 2018-06-11 RX ADMIN — HEPARIN SODIUM 5000 UNIT(S): 5000 INJECTION INTRAVENOUS; SUBCUTANEOUS at 01:41

## 2018-06-11 RX ADMIN — Medication 1 MILLIGRAM(S): at 12:05

## 2018-06-11 RX ADMIN — Medication 1 TABLET(S): at 12:05

## 2018-06-11 RX ADMIN — HEPARIN SODIUM 5000 UNIT(S): 5000 INJECTION INTRAVENOUS; SUBCUTANEOUS at 09:46

## 2018-06-11 RX ADMIN — Medication 105 MILLIGRAM(S): at 01:41

## 2018-06-11 RX ADMIN — Medication 650 MILLIGRAM(S): at 02:58

## 2018-06-11 RX ADMIN — HEPARIN SODIUM 5000 UNIT(S): 5000 INJECTION INTRAVENOUS; SUBCUTANEOUS at 17:20

## 2018-06-11 NOTE — PROGRESS NOTE ADULT - PROBLEM SELECTOR PLAN 2
-S/P Banana bag, will hold further fluids at this time  -Patient receiving 1mg folate, 1 multivitamin, 500mg Thiamine for possible Wernicke, will reassess need  -TSH, Folate, B12, and RPR negative  -PT evaluation: ANIA -S/P Banana bag, will hold further fluids at this time  -folate, multivitamin repletion  -TSH, Folate, B12, and RPR negative  -PT evaluation: ANIA  -Initial thought was patient had alcoholic related weakness, however, after further collateral obtained from outpatient PMD and patient family it appears patient quit drinking months ago. Unlikely to be contributory to overall weakness or current state. Weakness most likely 2/2 severe protein-calorie malnutrition 2/2 progressive dementia.

## 2018-06-11 NOTE — DISCHARGE NOTE ADULT - PLAN OF CARE
to supplement nutrition and initiate physical rehabilitation You were admitted due to a condition called severe protein malnutrition. This is due to not eating an appropriately balanced three meals daily. Please continue to supplement meals with Ensure Enlive. You were admitted due to confusion and difficult concentrating related to progressive dementia. 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 Please continue to work with physical therapy to regain strength to help you perform activities of daily living.

## 2018-06-11 NOTE — PROGRESS NOTE ADULT - PROBLEM SELECTOR PLAN 6
-Will hold off medications at this time as patient is not on any home meds -Will continue to monitor  -Consider restarting valsartan 160mg if needed

## 2018-06-11 NOTE — DISCHARGE NOTE ADULT - MEDICATION SUMMARY - MEDICATIONS TO TAKE
I will START or STAY ON the medications listed below when I get home from the hospital:    Multiple Vitamins oral tablet  -- 1 tab(s) by mouth once a day  -- Indication: For Severe protein-calorie malnutrition    folic acid 1 mg oral tablet  -- 1 tab(s) by mouth once a day  -- Indication: For Severe protein-calorie malnutrition

## 2018-06-11 NOTE — DISCHARGE NOTE ADULT - PROVIDER TOKENS
FREE:[LAST:[Kp],FIRST:[Shea],PHONE:[phone]],FAX:[(   )    -],ADDRESS:[[address] [place] [address]]]

## 2018-06-11 NOTE — DISCHARGE NOTE ADULT - CARE PLAN
Principal Discharge DX:	Severe protein-calorie malnutrition  Goal:	to supplement nutrition and initiate physical rehabilitation  Assessment and plan of treatment:	You were admitted due to a condition called severe protein malnutrition. This is due to not eating an appropriately balanced three meals daily. Please continue to supplement meals with Ensure Enlive.  Secondary Diagnosis:	Dementia without behavioral disturbance, unspecified dementia type  Assessment and plan of treatment:	You were admitted due to confusion and difficult concentrating related to progressive dementia. 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 Please continue to work with physical therapy to regain strength to help you perform activities of daily living.

## 2018-06-11 NOTE — PROGRESS NOTE ADULT - PROBLEM SELECTOR PLAN 3
Mild Cognitive Decline, but patient well studied and during interview took increasing time to arrive at answers. Extensive work up as an outpatient most likely a component of vascular and Alzheimer's  -Most likely underlying dementia given CTH findings of atrophic brain No intervention as per structural heart.

## 2018-06-11 NOTE — CONSULT NOTE ADULT - ATTENDING COMMENTS
Patient seen and examined.  Non-tender, partially reducible fat-containing umbilical hernia (? prior repair)    This is not causing his fever or other symptoms  Would not repair at this time.
85yo male w PMHx HTN, HLD, dementia, long standing ETOH abuse who presents per family with failure to thrive, significant neurocognitive decline x3wks, immobile. unable to obtain history by patient, aaox1 with confabulation in setting of brother/sister-in-law. reported walking 3mo ago without CP/shortness of breath. decline in neurocognitive function since november with multiple ER visits. has had home  physicians caring for him along with home aid. longstanding drinking history, unclear withdrawal/admission hx. TTE at [facility] revealed severe AS with nml EF, mean gradient 40mmHg. exam remarkable 3/6 NORTH base rad to carotid, cta bl, sntnd +bs, no cce, aaox1 moving all extremities. imp: neurocognitive decline in light of significant etoh history r/o wernicke encephalopathy, fhx of CVA/heart disease r/o vascular dementia, +/- delirium; severe aortic stenosis without evidence of acute CHF. high risk for SAVR/TAVR.  -not candidate for SAVR/TAVR given neurocognitive status; not currently in acute CHF, no indication for BAV at this time - reconsider if reversible component  -consultations with neuro/psych  -would be delighted to reevaluate as outpt if change in neuro status - office [phone]

## 2018-06-11 NOTE — CHART NOTE - NSCHARTNOTEFT_GEN_A_CORE
Patient spiked fever of 101, first fever since hospitalization. Other VS stable. Patient has cough when evaluated at bedside with sputum production. abdomen soft. large umbilical nonpainful hernia. Lungs CTABL. Patient has no other complaints, but is altered at baseline. Witnessed patient coughing with water. Aspiration pneumonia on ddx.     Fever with unclear source.   -RVP  -sputum cx  -Bcx x2  -tylenol for fever  -CXR  -UA  -monitor VS  -start abx if source identified is bacterial or sooner if decompensates. Patient spiked fever of 101, first fever since hospitalization. Other VS stable. Patient has cough when evaluated at bedside with sputum production. abdomen soft. large umbilical nonpainful hernia. Lungs CTABL. Patient has no other complaints, but is altered at baseline. Witnessed patient coughing with water. Aspiration pneumonia on ddx.     Fever with unclear source.   -RVP  -sputum cx  -Bcx x2  -tylenol for fever  -CXR  -UA  -monitor VS, now hemodynamically stable.  -start abx if source identified is bacterial or sooner if decompensates.  -stable, so no lactate necessary at this point

## 2018-06-11 NOTE — CONSULT NOTE ADULT - ASSESSMENT
84y Male w PMHx HTN, HLD, dementia, long standing ETOH abuse brought in by family for precipitous decline in neurocognitive function over the past several weeks.  During inpatient work up, pt found to have severe AS. Structural heart consult called for possible surgical intervention.  At this time patient is pleasantly confused, lying in bed, AxOx1 (person). Unable to provider adequate history/ROS.  Per family at bedside, he has lost 30lbs, sleeps for most of the day and refuses to eat.
85 y/o M PMHx HTN, HLD, dementia, presents with complaints of weakness, lethargy, progressive mental decline x1 month.    Problem/Plan - 1:  ·  Problem: Weakness.  Plan: Progressive mental decline, poor PO, lethargy, weight loss. CT head showing patchy multifocal lucency throughout the cerebral white matter, consistent with possible worsening vascular dementia. Hx of heavy alcohol use- Possible wernicke's. Also in DDX is malignancy. Hgb mildly decreased from few weeks ago. No infectious etiology at this time.  - Per family, patient had MRI outpatient 1 year ago, follow up records; may warrant repeat MRI brain and neuro consult in am  - Treat empirically for Wernicke's Encephalopathy- thiamine 500mg IV  - Nutrition consult in am  - c/w NS 100cc/hr  - f/u B12, folate, TSH, RPR  - 1 Banana bag now.     Problem/Plan - 2:  ·  Problem: Anemia.  Plan: Normocytic, normochromatic. No signs or symptoms of acute blood loss.  - f/u iron studies, retics.     Problem/Plan - 3:  ·  Problem: Dementia.  Plan: Progressive. Likely vascular given history and CT head findings. Recently stopped donepezil/memantine.
Assessment: 84y Male w/ hx of HTN, HLD, dementia, hx of CVAs, w/ chronic asymptomatic umbilical hernia    - patient asymptomatic with no complaints of pain, no n/v, tolerating regular diet, and reporting normal bowel movements  - no change in size in the last 4 years  - on exam, umbilical hernia non-reducible, non-tender, no cutaneous changes  - in setting of severe AS, patient is poor surgical candidate for elective umbilical hernia repair    Recommendations:  - no surgical intervention at this time  - extensive discussion held with patient and patient's family evaluating cost / benefit of repairing asymptomatic hernia in high risk patient  - patient and patient's family informed of warning signs for worsening hernia and incarcerated bowel (acute abdominal pain, n/v, cutaneous changes, obstipation)  - patient's recent neurocognitive decline concerning for increased risk for aspiration  ---- consider speech and swallow evaluation  - appreciate care per primary team  - signing off  - please call for any acute changes or if you have questions

## 2018-06-11 NOTE — PROGRESS NOTE ADULT - PROBLEM SELECTOR PLAN 7
F: No IVF  E: Replete PRN  N: DASH/TLC; Ensure Enlive supplementation -Will hold off medications at this time as patient is not on any home meds

## 2018-06-11 NOTE — CONSULT NOTE ADULT - SUBJECTIVE AND OBJECTIVE BOX
Surgeon: MACHELLE Blandon    Requesting Physician: Dr. Gambino    HISTORY OF PRESENT ILLNESS:  84y Male w PMHx HTN, HLD, dementia, long standing ETOH abuse brought in by family for precipitous decline in neurocognitive function over the past several weeks.  During inpatient work up, pt found to have severe AS. Structural heart consult called for possible surgical intervention.  At this time patient is pleasantly confused, lying in bed, AxOx1 (person). Unable to provider adequate history/ROS.  Per family at bedside, he has lost 30lbs, sleeps for most of the day and refuses to eat.     PAST MEDICAL & SURGICAL HISTORY:  Dementia without behavioral disturbance, unspecified dementia type  Hypertension  Hypercholesterolemia  No significant past surgical history      MEDICATIONS  (STANDING):  folic acid 1 milliGRAM(s) Oral daily  heparin  Injectable 5000 Unit(s) SubCutaneous every 8 hours  multivitamin 1 Tablet(s) Oral daily  thiamine IVPB 500 milliGRAM(s) IV Intermittent every 8 hours      Allergies  No Known Allergies    SOCIAL HISTORY:  Former Smoker  ETOH: Current heavy liquor drinker.  Ilicit Drug use:  NO  Occupation: Former leading   Assisted device use (Cane / Walker): does not walk currently  Live with: Alone.  Daughter lives in same building but does not participate in care.  Younger Brother /sister-in-law live in [facility] and are coordinating care.    FAMILY HISTORY:  CAD (coronary atherosclerotic disease) (Father)  Family history of stroke (Mother)      Review of Systems: UNable to attain 2/2 dementia                                                 PHYSICAL EXAM  Vital Signs Last 24 Hrs  T(C): 36.7 (2018 16:32), Max: 37.9 (2018 05:23)  T(F): 98.1 (2018 16:32), Max: 100.2 (2018 05:23)  HR: 95 (2018 16:32) (69 - 96)  BP: 143/85 (2018 16:32) (134/75 - 169/76)  BP(mean): --  RR: 18 (2018 16:32) (18 - 19)  SpO2: 93% (2018 16:32) (93% - 97%)    CONSTITUTIONAL:    NAD, lying in bed, appears unkept.  NEURO:    AxOx1                 EYES:   WNL  ENMT:     WNL  CV:  S1S2, RRR, +NORTH  RESPIRATORY: CTA  GI:   +BS, soft. + large soft most reducible umbilical hernia  : WNL  MUSKULOSKELETAL: WNL  SKIN / BREAST:     WNL  Extremities: no edema  Vascular: palpable and equal bilateral.                                                          LABS:                        11.0   12.7  )-----------( 374      ( 2018 07:38 )             33.4     -    138  |  101  |  21  ----------------------------<  133<H>  3.7   |  26  |  0.91    Ca    9.0      2018 07:38  Phos  3.1       Mg     2.1     -08    TPro  7.3  /  Alb  1.9<L>  /  TBili  0.2  /  DBili  x   /  AST  30  /  ALT  50<H>  /  AlkPhos  107      Urinalysis Basic - ( 2018 19:44 )    Color: Yellow / Appearance: Clear / S.020 / pH: x  Gluc: x / Ketone: NEGATIVE  / Bili: NEGATIVE / Urobili: 0.2 E.U./dL   Blood: x / Protein: NEGATIVE mg/dL / Nitrite: NEGATIVE   Leuk Esterase: NEGATIVE / RBC: < 5 /HPF / WBC < 5 /HPF   Sq Epi: x / Non Sq Epi: Few /HPF / Bacteria: Present /HPF      CARDIAC MARKERS ( 2018 16:18 )  <0.017 ng/mL / x     / x     / x     / x          Thyroid Stimulating Hormone, Serum: 1.888 uIU/mL ( @ 07:38)  Serum Pro-Brain Natriuretic Peptide: 1802 pg/mL (18 @ 19:28)    RADIOLOGY & ADDITIONAL STUDIES:    CAROTID U/S:  Ordered    CXR:  < from: Xray Chest 1 View AP/PA (18 @ 18:51) >  IMPRESSION: No evidence of active disease. No significant change from   2018.    CT Scan:  < from: CT Head No Cont (18 @ 18:31) >  No acute intracranial abnormality. Specifically, no acute intracranial   hemorrhage, mass effect or recent transcortical or territorial   infarction.     Parenchymal volume loss and chronic ischemic changes, unchanged compared   to 2018.    < from: CT Abdomen and Pelvis w/ IV Cont (18 @ 18:35) >  Limited study due to patient's position and motion. Patient's arms cover   the umbilical region.     9 cm fat-containing umbilical hernia. No bowel obstruction or free air.    Severe calcification of the aortic valve. Findings can be seen in aortic   valvular stenosis.    Simple and complicated renal cyst. Recommend renal ultrasound to exclude   solid lesion.      EKG:  < from: 12 Lead ECG (18 @ 02:15) >  Diagnosis Line Sinus tachycardiawith occasional premature ventricular complexes  ST - T  abnormalities      TTE / TREASURE:  < from: Echocardiogram (18 @ 12:35) >  InterpretationSummary  Normal left ventricular size and wall thickness.The left ventricular wall   motion is normal.The left ventricular ejection fraction is normal.The   left   ventricular ejection fraction is 55%.The right ventricle is normal in   size and   function.The left atrial size is normal.The mitral inflow pattern is   consistent with impaired left ventricular relaxation with mildly   elevated left   atrial pressure (8-14mmHg).  Right atrial size is normal.Calcified aortic   valve.No aortic regurgitation noted.There is Severe aortic stenosis.The   peak   pressure gradient is 70 mmHg.The mean pressure gradient is 40 mmHg.The   calculated aortic valve area using the continuity equation is 0.7   cm2.Calcified mitral valve leaflets.No mitral regurgitation   noted.Structurally   normal tricuspid valve.No tricuspid regurgitation noted.The pulmonary   artery   systolic pressure is estimated to be 23 mmHg.Structurally normal pulmonic   valve.No pulmonic regurgitation noted.There is no pericardial   effusion.Above   findings were discussed with Dr. Tarik Clark on 2018 at 1:50 pm    < end of copied text >
Attending:  Paulie    HPI:  HPI: 85 y/o M PMHx HTN, HLD, dementia, alcohol use disorder, presents with complaints of weakness. Per brother at bedside, patient has had dementia for 3 years and as of 1 year ago has been considerably more eccentric. 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 Patient has had 3 mechanical falls over the past 3 months, in January he slipped on ice and most recently  he slipped on the wet ground. He has previously been able to walk the dog, not really take care of himself in terms of ADLs, but his daughter lives 2 stories above him and patient lives on the 2nd floor of a walk-up. Since the most recent fall, patient has been afraid of walking outside in fear of falling, has been more odd and having "delusions" per the brother such as having a conversation then trailing off and not making sense. He has been increasingly agitated as well and has been difficult to re-orient but seems to respond to the brother. Patient has also had worsening PO over the past month and needs to be encouraged to eat or drink anything. He mostly sleeps all day. Lost 30lbs over 6 weeks. He has been stooling himself and not bothering to clean himself or ask for help. Pt used to drink heavily per the brother, last drink was 3 weeks ago. Per his  doctor and psychiatrist, this is most likely progression of vascular dementia. He has been worked up extensively outpatient and including in our ED for various etiologies. Difficult to obtain ROS as patient is tangential and nonsensical despite following some commands. Per brother, does not know home medications was previously on statin, also was on 2 medications (possibly donepezil/memantine that was stopped 1 week ago), med rec also lists valsartan.    Surgery Addendum:   General Surgery consulted for chronic umbilical hernia as possible source of new fever overnight. Patient w/ hx of HTN, HLD, dementia, and hx of CVAs, admitted to medicine for failure to thrive and 30 pound weight loss in the last 2 months. Patient reports chronic umbilical hernia which has been asymptomatic and unchanged in size for the last 4 years. Prior to 4 years ago, patient and patient's family reports gradual increase in size of hernia, but unsure of when it was first noticed. Patient denies any recent nausea, vomiting, is tolerating regular food without any postprandial discomfort, reports normal bowel movements, and last bowel movement was yesterday. Patient reports normal bowel movement and denies diarrhea, blood, or constipation. Denies any cutaneous changes or pain around the hernia.      Patient denies any history of prior abdominal surgeries. Echocardiogram shows severe aortic stenosis. Patient denies history of MI or previous coronary intervention. No known pulmonary conditions, non-diabetic. Patient's family reports patient recently being taking off of dementia medication (for unclear reasons), and reports accelerated neurocognitive decline.      Vital Signs Last 24 Hrs  T(C): 37.3 (2018 09:25), Max: 38.3 (2018 02:16)  T(F): 99.1 (2018 09:25), Max: 101 (2018 02:16)  HR: 88 (2018 09:25) (88 - 98)  BP: 147/90 (2018 09:25) (110/60 - 148/91)  BP(mean): --  RR: 19 (2018 09:25) (19 - 20)  SpO2: 91% (2018 09:25) (91% - 96%)PRE OPERATIVE NOTE      Physical Exam:  General:  alert and oriented x1; pleasant, conversational,   HEENT: normocephalic, atraumatic, no JVD,   Pulmonary: ctab, no respiratory distress, non labored breathing  Cardiovascular: 3/6 systolic murmur appreciated, rrr, normotensive to sbp 130  Abdominal: soft, non-distended, no surgical scars appreciated, large saccular 6x7 cm umbilical hernia, no cutaneous changes, non-reducible, non-tender to palpation and attempts to reduce  Extremities: warm well-perfused lower extremities, non-edematous, palpable DP/PT bilaterally                              LABS:                        10.7   11.7  )-----------( 377      ( 2018 07:31 )             32.8     06-11    139  |  101  |  27<H>  ----------------------------<  126<H>  4.0   |  27  |  1.15    Ca    9.2      2018 07:31  Mg     2.6     06-11        Urinalysis Basic - ( 2018 04:02 )    Color: Yellow / Appearance: SL Cloudy / S.015 / pH: x  Gluc: x / Ketone: NEGATIVE  / Bili: Negative / Urobili: 0.2 E.U./dL   Blood: x / Protein: NEGATIVE mg/dL / Nitrite: NEGATIVE   Leuk Esterase: NEGATIVE / RBC: x / WBC x   Sq Epi: x / Non Sq Epi: x / Bacteria: x          Cultures:      RADIOLOGY & ADDITIONAL STUDIES:    < from: CT Abdomen and Pelvis w/ IV Cont (18 @ 18:35) >  FINDINGS: Study is limited by respiratory motion and artifact from the   upper extremities position on the the side of the patient.    Images of the lower chest demonstrate linear atelectatic changes in the   lung bases. There is severe calcification of the aortic valve. A small   hiatal hernia is noted.    There is a 0.5 cm hypodense focus in hepatic segment 2 which is too small   to characterize. No radiopaque stones are seen in the gallbladder.  The   pancreas is normal in appearance.  No splenic abnormalities are seen.    The adrenal glands are unremarkable.. There is a 1.5 cm exophytic lesion   in the posterior aspect of the upper pole of the right kidney which   measures higher than water density. There is a 2.5 cm exophytic lesion in   the upper pole of the left kidney which measures higher than water   density. There is a 7.6 cm cyst in the anterior aspect of the left   kidney. There are a few other simple cysts in both kidneys. No   hydronephrosis or nephrolithiasis is seen.    Calcific atherosclerotic disease of the abdominal aorta is noted. No   abdominal aortic aneurysm is seen. No lymphadenopathy is seen.     The patient's arms are on his abdomen and obscure the abdomen. There is a   large umbilical hernia containing fat that measures about 9 cm. No bowel   or other organs are present within the umbilical hernia. There is no   bowel obstruction or free air. Moderate amount of colonic stool is   present. Apparent gastric wall thickening at the posterior aspect of the   fundus could be due to under distention.    Images of the pelvis demonstrate no bladder abnormality. Prostate is not   enlarged.    Evaluation of the osseous structures demonstrates degenerative changes.      IMPRESSION:  Limited study due to patient's position and motion. Patient's arms cover   the umbilical region.     9 cm fat-containing umbilical hernia. No bowel obstruction or free air.    Severe calcification of the aortic valve. Findings can be seen in aortic   valvular stenosis.    Simple and complicated renal cyst. Recommend renal ultrasound to exclude   solid lesion.    < end of copied text >      < from: Echocardiogram (18 @ 12:35) >  Normal left ventricular size and wall thickness.The left ventricular wall   motion is normal.The left ventricular ejection fraction is normal.The   left   ventricular ejection fraction is 55%.The right ventricle is normal in   size and   function.The left atrial size is normal.The mitral inflow pattern is   consistent with impaired left ventricular relaxation with mildly   elevated left   atrial pressure (8-14mmHg).  Right atrial size is normal.Calcified aortic   valve.No aortic regurgitation noted.There is Severe aortic stenosis.The   peak   pressure gradient is 70 mmHg.The mean pressure gradient is 40 mmHg.The   calculated aortic valve area using the continuity equation is 0.7   cm2.Calcified mitral valve leaflets.No mitral regurgitation   noted.Structurally   normal tricuspid valve.No tricuspid regurgitation noted.The pulmonary   artery   systolic pressure is estimated to be 23 mmHg.Structurally normal pulmonic   valve.No pulmonic regurgitation noted.There is no pericardial   effusion.Above   findings were discussed with Dr. Tarik Clark on 2018 at 1:50 pm  Procedure Details  A complete two-dimensional transthoracic echocardiogram was performed (2D,  M-mode, spectral and color flow doppler).  Study Quality: Fair.  Left Ventricle  Normal left ventricular size and wall thickness.  The left ventricular wall motion is normal.  The left ventricular ejection fraction is normal.  The left ventricular ejection fraction is 55%.  Left Atrium  The left atrial size is normal.  The mitral inflow pattern is consistent with impaired left ventricular  relaxation with mildly elevated left atrial pressure (8-14mmHg).  Right Atrium  Right atrial size is normal.  Right Ventricle  The right ventricle is normal in size and function.  Aortic Valve  Calcified aortic valve.  No aortic regurgitation noted.  The peak pressure gradient is 70 mmHg.  The mean pressure gradient is 40 mmHg.  There is Severe aortic stenosis.  The calculated aortic valve area using the continuity equationis 0.7 cm2.  Mitral Valve  Calcified mitral valve leaflets.  No mitral regurgitation noted.  Tricuspid Valve  Structurally normal tricuspid valve.  No tricuspid regurgitation noted.  The pulmonary artery systolic pressure is estimated to be 23 mmHg.  Pulmonic Valve  Structurally normal pulmonic valve.  No pulmonic regurgitation noted.  Arteries and Venous System  No aortic root dilatation.    < end of copied text >
Patient is a 84y old  Male who presents with a chief complaint of failure to thrive (2018 23:53)       HPI:  HPI: 85 y/o M PMHx HTN, HLD, dementia, alcohol use disorder, presents with complaints of weakness. Per brother at bedside, patient has had dementia for 3 years and as of 1 year ago has been considerably more eccentric. 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 Patient has had 3 mechanical falls over the past 3 months, in January he slipped on ice and most recently  he slipped on the wet ground. He has previously been able to walk the dog, not really take care of himself in terms of ADLs, but his daughter lives 2 stories above him and patient lives on the 2nd floor of a walk-up. Since the most recent fall, patient has been afraid of walking outside in fear of falling, has been more odd and having "delusions" per the brother such as having a conversation then trailing off and not making sense. He has been increasingly agitated as well and has been difficult to re-orient but seems to respond to the brother. Patient has also had worsening PO over the past month and needs to be encouraged to eat or drink anything. He mostly sleeps all day. Lost 30lbs over 6 weeks. He has been stooling himself and not bothering to clean himself or ask for help. Pt used to drink heavily per the brother, last drink was 3 weeks ago. Per his  doctor and psychiatrist, this is most likely progression of vascular dementia. He has been worked up extensively outpatient and including in our ED for various etiologies. Difficult to obtain ROS as patient is tangential and nonsensical despite following some commands. Per brother, does not know home medications was previously on statin, also was on 2 medications (possibly donepezil/memantine that was stopped 1 week ago), med rec also lists valsartan.    ED vitals:  T(F): 98.9, Max: 99.7  HR: 87 (78 - 89)  BP: 134/75 (112/81 - 148/75)  RR: 18 (18 - 20)  SpO2: 97% (96% - 98%)    Labs Hgb 11.2, previously 12.8 -> 12.5. Cr 1.23. UA negative. Blood culture from 2 days ago normal.  CXR no infiltrates or effusions. CT abdomen/pelvis w. IV contrast without acute etiologies.  NS 150cc/hr (2018 23:53)      PAST MEDICAL & SURGICAL HISTORY:  Dementia without behavioral disturbance, unspecified dementia type  Hypertension  Hypercholesterolemia  No significant past surgical history      MEDICATIONS  (STANDING):  folic acid 1 milliGRAM(s) Oral daily  heparin  Injectable 5000 Unit(s) SubCutaneous every 8 hours  multivitamin 1 Tablet(s) Oral daily  thiamine IVPB 500 milliGRAM(s) IV Intermittent every 8 hours    MEDICATIONS  (PRN):      Social History: lives alone in a second floor walkup apartment, daughter lives in the same building, no home care services    Functional Level Prior to Admission: brother reports decline in ADL's, walks without devices    FAMILY HISTORY:  CAD (coronary atherosclerotic disease) (Father)  Family history of stroke (Mother)      CBC Full  -  ( 2018 07:38 )  WBC Count : 12.7 K/uL  Hemoglobin : 11.0 g/dL  Hematocrit : 33.4 %  Platelet Count - Automated : 374 K/uL  Mean Cell Volume : 87.4 fL  Mean Cell Hemoglobin : 28.8 pg  Mean Cell Hemoglobin Concentration : 32.9 g/dL  Auto Neutrophil # : x  Auto Lymphocyte # : x  Auto Monocyte # : x  Auto Eosinophil # : x  Auto Basophil # : x  Auto Neutrophil % : x  Auto Lymphocyte % : x  Auto Monocyte % : x  Auto Eosinophil % : x  Auto Basophil % : x      -    138  |  101  |  21  ----------------------------<  133<H>  3.7   |  26  |  0.91    Ca    9.0      2018 07:38  Phos  3.1     06-08  Mg     2.1     06-08    TPro  7.3  /  Alb  1.9<L>  /  TBili  0.2  /  DBili  x   /  AST  30  /  ALT  50<H>  /  AlkPhos  107  06-07      Urinalysis Basic - ( 2018 19:44 )    Color: Yellow / Appearance: Clear / S.020 / pH: x  Gluc: x / Ketone: NEGATIVE  / Bili: NEGATIVE / Urobili: 0.2 E.U./dL   Blood: x / Protein: NEGATIVE mg/dL / Nitrite: NEGATIVE   Leuk Esterase: NEGATIVE / RBC: < 5 /HPF / WBC < 5 /HPF   Sq Epi: x / Non Sq Epi: Few /HPF / Bacteria: Present /HPF          Radiology:    < from: CT Head No Cont (18 @ 18:31) >  EXAM:  CT BRAIN                           PROCEDURE DATE:  2018          INTERPRETATION:  PROCEDURE: CT head without contrast.    INDICATION: Altered mental status    TECHNIQUE: Multiple axial sections were obtained at 5 mm intervals. The   images were reviewed in brain and bone windows. Sagittal and coronal   reformations are provided.    COMPARISON: 2018    FINDINGS:     There is no acute intracranial hemorrhage, mass effect, midline shift or   extra axial collections. The gray white differentiation appears grossly   preserved without evidence for an acute transcortical infarction.     There is unchanged mild diffuse pregnant volume loss. Also unchanged is   patchy multifocal lucency throughout the cerebral white matter, likely   consequence of long-standing small vessel ischemic disease, moderate in   degree. Few small lacunar infarctions in the deep gray nuclei are similar   as well.    The bony windows demonstrates no fractures. The included paranasal   sinuses and mastoid air cells are predominantly clear.    IMPRESSION:     No acute intracranial abnormality. Specifically, no acute intracranial   hemorrhage, mass effect or recent transcortical or territorial   infarction.     Parenchymal volume loss and chronic ischemic changes, unchanged compared   to 2018.        < from: CT Abdomen and Pelvis w/ IV Cont (18 @ 18:35) >  EXAM:  CT ABDOMEN AND PELVIS IC                           PROCEDURE DATE:  2018          INTERPRETATION:  CT of the abdomen and pelvis with contrast dated   2018 6:35 PM    INDICATION: Umbilical hernia pain.    TECHNIQUE: CT of abdomen and pelvis was performed using intravenous   contrast. Axial, sagittal and coronal images were produced and reviewed.    INTRAVENOUS CONTRAST: 100 cc of Optiray 350.    PRIOR STUDIES: Not    FINDINGS: Study is limited by respiratory motion and artifact from the   upper extremities position on the the side of the patient.    Images of the lower chest demonstrate linear atelectatic changes in the   lung bases. There is severe calcification of the aortic valve. A small   hiatal hernia is noted.    There is a 0.5 cm hypodense focus in hepatic segment 2 which is too small   to characterize. No radiopaque stones are seen in the gallbladder.  The   pancreas is normal in appearance.  No splenic abnormalities are seen.    The adrenal glands are unremarkable.. There is a 1.5 cm exophytic lesion   in the posterior aspect of the upper pole of the right kidney which   measures higher than water density. There is a 2.5 cm exophytic lesion in   the upper pole of the left kidney which measures higher than water   density. There is a 7.6 cm cyst in the anterior aspect of the left   kidney. There are a few other simple cysts in both kidneys. No   hydronephrosis or nephrolithiasis is seen.    Calcific atherosclerotic disease of the abdominal aorta is noted. No   abdominal aortic aneurysm is seen. No lymphadenopathy is seen.     The patient's arms are on his abdomen and obscure the abdomen. There is a   large umbilical hernia containing fat that measures about 9 cm. No bowel   or other organs are present within the umbilical hernia. There is no   bowel obstruction or free air. Moderate amount of colonic stool is   present. Apparent gastric wall thickening at the posterior aspect of the   fundus could be due to under distention.    Images of the pelvis demonstrate no bladder abnormality. Prostate is not   enlarged.    Evaluation of the osseous structures demonstrates degenerative changes.      IMPRESSION:  Limited study due to patient's position and motion. Patient's arms cover   the umbilical region.     9 cm fat-containing umbilical hernia. No bowel obstruction or free air.    Severe calcification of the aortic valve. Findings can be seen in aortic   valvular stenosis.    Simple and complicated renal cyst. Recommend renal ultrasound to exclude   solid lesion.          Vital Signs Last 24 Hrs  T(C): 37.9 (2018 05:23), Max: 37.9 (2018 05:23)  T(F): 100.2 (2018 05:23), Max: 100.2 (2018 05:23)  HR: 69 (2018 07:28) (69 - 93)  BP: 138/88 (2018 07:28) (112/81 - 169/76)  BP(mean): --  RR: 18 (2018 05:23) (18 - 20)  SpO2: 95% (2018 05:23) (95% - 98%)    REVIEW OF SYSTEMS:    CONSTITUTIONAL:  fatigue  EYES: No eye pain, visual disturbances, or discharge  ENMT:  No difficulty hearing, tinnitus, vertigo; No sinus or throat pain  NECK: No pain or stiffness  BREASTS: No pain, masses, or nipple discharge  RESPIRATORY: No cough, wheezing, chills or hemoptysis; No shortness of breath  CARDIOVASCULAR: No chest pain, palpitations, dizziness, or leg swelling  GASTROINTESTINAL: No abdominal or epigastric pain. No nausea, vomiting, or hematemesis; No diarrhea or constipation. No melena or hematochezia.  GENITOURINARY: No dysuria, frequency, hematuria, or incontinence  NEUROLOGICAL: No headaches, memory loss, loss of strength, numbness, or tremors  SKIN: No itching, burning, rashes, or lesions   LYMPH NODES: No enlarged glands  ENDOCRINE: No heat or cold intolerance; No hair loss  MUSCULOSKELETAL: No joint pain or swelling; No muscle, back, or extremity pain  PSYCHIATRIC: No depression, anxiety, mood swings, or difficulty sleeping  HEME/LYMPH: No easy bruising, or bleeding gums  ALLERGY AND IMMUNOLOGIC: No hives or eczema  VASCULAR: no swelling, erythema    Physical Exam: elderly  gentleman lying in semi King's position, c/o feeling tired    Head: normocephalic, atraumatic    Eyes: PERRLA, EOMI, no nystagmus, sclera anicteric    ENT: nasal discharge, uvula midline, no oropharyngeal erythema/exudate    Neck: supple, negative JVD, negative carotid bruits, no thyromegaly    Chest: CTA bilaterally, neg wheeze, rhonchi, rales, crackles, egophany    Cardiovascular: regular rate and rhythm, neg murmurs/rubs/gallops    Abdomen: + reducible umbilical hernia, soft, non tender, negative rebound/guarding, normal bowel sounds, neg hepatosplenomegaly    Extremities: WWP, neg cyanosis/clubbing/edema, negative calf tenderness to palpation, negative Leodan's sign    :     Neurologic Exam:    Alert and oriented x 2 to person, place, speech fluent w/o dysarthria, repetition intact, comprehension intact,     Cranial Nerves:     II:                       pupils equal, round and reactive to light, visual fields intact   III/ IV/VI:             extraocular movements intact, neg nystagmus, ptosis  V:                       facial sensation intact, V1-3 normal  VII:                     face symmetric, no droop, normal eye closure and smile  VIII:                    hearing intact to finger rub bilaterally  IX/ X:                 soft palate rise symmetrical  XI:                      head turning, shoulder shrug normal  XII:                     tongue midline    Motor Exam:    Upper Extremities:        Right:    poor effort > 3+/5                                       Left:      poor effort > 3+/5      Lower Extremities:         Right      poor effort > 3+/5                                       Left:       poor effort > 3+/5    Sensory:              intact to LT/PP in all UE/LE dermatomes    DTR:                   = biceps/     triceps/     brachioradialis                            = patella/   medial hamstring/    ankle                            neg clonus                            neg Babinski                            neg Hoffmans    Finger to Nose:  wnl    Heel to Shin:       wnl    Rapid Alternating movements:   wnl    Joint Position Sense:  intact    Romberg:  not tested    Tandem Walking:  not tested    Gait:  not tested        PM&R Impression:    1) deconditioned  2) gait dysfunction      Recommendations:    1) Physical therapy focusing on therapeutic exercises, bed mobility/transfer out of bed evaluation, progressive ambulation with assistive devices.    2) Anticipated Disposition Plan/Recommendations: subacute rehab placement

## 2018-06-11 NOTE — PROGRESS NOTE ADULT - SUBJECTIVE AND OBJECTIVE BOX
O/N Events: Patient spiked a temp 101F oral overnight. Work up was sent. No identifiable source was obtained and patient was stable so antibiotics were held.  Subjective/ROS: Patient complaining of left arm pain at IV site and pain at hernia site. Denies HA, CP, SOB, n/v, changes in bowel/urinary habits.  12pt ROS otherwise negative.    VITALS  Vital Signs Last 24 Hrs  T(C): 37 (2018 04:40), Max: 38.3 (2018 02:16)  T(F): 98.6 (2018 04:40), Max: 101 (2018 02:16)  HR: 97 (2018 04:40) (91 - 98)  BP: 137/80 (2018 04:40) (110/60 - 148/91)  BP(mean): --  RR: 20 (2018 04:40) (20 - 22)  SpO2: 95% (2018 04:40) (94% - 96%)    CAPILLARY BLOOD GLUCOSE    PHYSICAL EXAM  General: Elderly unkempt male lying in bed  Head: NC/AT; MMM; PERRL; EOMI;  Neck: Supple; no JVD  Respiratory: CTA B/L; no wheezes/crackles   Cardiovascular: Regular rhythm/rate; S1/S2   Gastrointestinal: Soft; NTND; normoactive BS presence of ventral abdominal wall hernia, pain when palpated, non-reducible but felt soft and nonpulsatile   Extremities: WWP; no edema/cyanosis  Neurological:  CNII-XII grossly intact; decreased strength 4/5    MEDICATIONS  (STANDING):  folic acid 1 milliGRAM(s) Oral daily  heparin  Injectable 5000 Unit(s) SubCutaneous every 8 hours  multivitamin 1 Tablet(s) Oral daily  thiamine IVPB 500 milliGRAM(s) IV Intermittent every 8 hours    MEDICATIONS  (PRN):      No Known Allergies      LABS                        10.7   11.7  )-----------( 377      ( 2018 07:31 )             32.8     06-11    139  |  101  |  27<H>  ----------------------------<  126<H>  4.0   |  27  |  1.15    Mg     2.6     06-11        Urinalysis Basic - ( 2018 04:02 )    Color: Yellow / Appearance: SL Cloudy / S.015 / pH: x  Gluc: x / Ketone: NEGATIVE  / Bili: Negative / Urobili: 0.2 E.U./dL   Blood: x / Protein: NEGATIVE mg/dL / Nitrite: NEGATIVE   Leuk Esterase: NEGATIVE / RBC: x / WBC x   Sq Epi: x / Non Sq Epi: x / Bacteria: x O/N Events: Patient spiked a temp 101F oral overnight. Work up was sent. No identifiable source was obtained and patient was stable so antibiotics were held.  Subjective/ROS: Patient complaining of left arm pain at IV site and pain at hernia site. Denies HA, CP, SOB, n/v, changes in bowel/urinary habits.  12pt ROS otherwise negative.    VITALS  Vital Signs Last 24 Hrs  T(C): 37 (2018 04:40), Max: 38.3 (2018 02:16)  T(F): 98.6 (2018 04:40), Max: 101 (2018 02:16)  HR: 97 (2018 04:40) (91 - 98)  BP: 137/80 (2018 04:40) (110/60 - 148/91)  BP(mean): --  RR: 20 (2018 04:40) (20 - 22)  SpO2: 95% (2018 04:40) (94% - 96%)    CAPILLARY BLOOD GLUCOSE    PHYSICAL EXAM  General: Elderly unkempt male lying in bed  Head: NC/AT; MMM; PERRL; EOMI;  Neck: Supple; no JVD  Respiratory: CTA B/L; no wheezes/crackles   Cardiovascular: Regular rhythm/rate; S1/S2; 3/6 Systolic Ejection Murmur RUSB  Gastrointestinal: Soft; NTND; normoactive BS presence of ventral abdominal wall hernia, pain when palpated, non-reducible but felt soft and nonpulsatile   Extremities: WWP; no edema/cyanosis  Neurological:  CNII-XII grossly intact; decreased strength 4/5    MEDICATIONS  (STANDING):  folic acid 1 milliGRAM(s) Oral daily  heparin  Injectable 5000 Unit(s) SubCutaneous every 8 hours  multivitamin 1 Tablet(s) Oral daily  thiamine IVPB 500 milliGRAM(s) IV Intermittent every 8 hours    MEDICATIONS  (PRN):      No Known Allergies      LABS                        10.7   11.7  )-----------( 377      ( 2018 07:31 )             32.8     06-11    139  |  101  |  27<H>  ----------------------------<  126<H>  4.0   |  27  |  1.15    Mg     2.6     06-11        Urinalysis Basic - ( 2018 04:02 )    Color: Yellow / Appearance: SL Cloudy / S.015 / pH: x  Gluc: x / Ketone: NEGATIVE  / Bili: Negative / Urobili: 0.2 E.U./dL   Blood: x / Protein: NEGATIVE mg/dL / Nitrite: NEGATIVE   Leuk Esterase: NEGATIVE / RBC: x / WBC x   Sq Epi: x / Non Sq Epi: x / Bacteria: x

## 2018-06-11 NOTE — PROGRESS NOTE ADULT - PROBLEM SELECTOR PLAN 8
P: 5000U HSQ q8h  C: FULL CODE  D: Pending [facility] facility F: No IVF  E: Replete PRN  N: DASH/TLC; Ensure Enlive supplementation

## 2018-06-11 NOTE — PROGRESS NOTE ADULT - PROBLEM SELECTOR PLAN 4
Patient with normocytic anemia  -Iron labs consistent with anemia of chronic disease however patient without any signs  -As per admitting team patient without signs of hematochezia/melena during rectal   -Will continue to trend CBC Mild Cognitive Decline, but patient well studied and during interview took increasing time to arrive at answers. Extensive work up as an outpatient most likely a component of vascular and Alzheimer's  -Most likely underlying dementia given CTH findings of atrophic brain

## 2018-06-11 NOTE — PROGRESS NOTE ADULT - PROBLEM SELECTOR PLAN 5
-Will continue to monitor  -Consider restarting valsartan 160mg if needed Patient with normocytic anemia  -Iron labs consistent with anemia of chronic disease however patient without any signs  -As per admitting team patient without signs of hematochezia/melena during rectal   -Will continue to trend CBC

## 2018-06-11 NOTE — DISCHARGE NOTE ADULT - HOSPITAL COURSE
84M with HTN, HLD, dementia, who presents with progressively worsening weakness, lethargy and mental decline over the past month as per family. TSH, B12, RPR were within normal limits for patient. CTH revealed atrophy of brain and previous CVA. Collateral was obtained from outpatient PMD who stated patient has had steady decline in function the past few months and has been unable to perform ADLs adequately at home. He was started on nutrition supplementation. Heart murmur was heard and patient was evaluated by CTS due to severe Aortic Stenosis but is not a candidate for surgery due to poor functional status. Patient spiked one transient fever of 101F but subsequently had no fever or increasing white count after. Patient remained Hemodynamically stable and clear for discharge.

## 2018-06-11 NOTE — DISCHARGE NOTE ADULT - PATIENT PORTAL LINK FT
You can access the [facility] Portal, offered by [facility], by registering with the following website: [email or web address]

## 2018-06-12 VITALS
HEART RATE: 89 BPM | OXYGEN SATURATION: 96 % | TEMPERATURE: 99 F | DIASTOLIC BLOOD PRESSURE: 77 MMHG | SYSTOLIC BLOOD PRESSURE: 143 MMHG | RESPIRATION RATE: 18 BRPM

## 2018-06-12 LAB
ANION GAP SERPL CALC-SCNC: 12 MMOL/L — SIGNIFICANT CHANGE UP (ref 5–17)
BUN SERPL-MCNC: 30 MG/DL — HIGH (ref 7–23)
CALCIUM SERPL-MCNC: 9.3 MG/DL — SIGNIFICANT CHANGE UP (ref 8.4–10.5)
CHLORIDE SERPL-SCNC: 100 MMOL/L — SIGNIFICANT CHANGE UP (ref 96–108)
CO2 SERPL-SCNC: 26 MMOL/L — SIGNIFICANT CHANGE UP (ref 22–31)
CREAT SERPL-MCNC: 1.02 MG/DL — SIGNIFICANT CHANGE UP (ref 0.5–1.3)
GLUCOSE SERPL-MCNC: 128 MG/DL — HIGH (ref 70–99)
GRAM STN FLD: SIGNIFICANT CHANGE UP
HCT VFR BLD CALC: 33.3 % — LOW (ref 39–50)
HGB BLD-MCNC: 10.6 G/DL — LOW (ref 13–17)
MAGNESIUM SERPL-MCNC: 2.4 MG/DL — SIGNIFICANT CHANGE UP (ref 1.6–2.6)
MCHC RBC-ENTMCNC: 28.3 PG — SIGNIFICANT CHANGE UP (ref 27–34)
MCHC RBC-ENTMCNC: 31.8 G/DL — LOW (ref 32–36)
MCV RBC AUTO: 89 FL — SIGNIFICANT CHANGE UP (ref 80–100)
PLATELET # BLD AUTO: 402 K/UL — HIGH (ref 150–400)
POTASSIUM SERPL-MCNC: 3.9 MMOL/L — SIGNIFICANT CHANGE UP (ref 3.5–5.3)
POTASSIUM SERPL-SCNC: 3.9 MMOL/L — SIGNIFICANT CHANGE UP (ref 3.5–5.3)
RBC # BLD: 3.74 M/UL — LOW (ref 4.2–5.8)
RBC # FLD: 13.9 % — SIGNIFICANT CHANGE UP (ref 10.3–16.9)
SODIUM SERPL-SCNC: 138 MMOL/L — SIGNIFICANT CHANGE UP (ref 135–145)
SPECIMEN SOURCE: SIGNIFICANT CHANGE UP
WBC # BLD: 11.2 K/UL — HIGH (ref 3.8–10.5)
WBC # FLD AUTO: 11.2 K/UL — HIGH (ref 3.8–10.5)

## 2018-06-12 PROCEDURE — 86780 TREPONEMA PALLIDUM: CPT

## 2018-06-12 PROCEDURE — 71045 X-RAY EXAM CHEST 1 VIEW: CPT

## 2018-06-12 PROCEDURE — 93306 TTE W/DOPPLER COMPLETE: CPT

## 2018-06-12 PROCEDURE — 83605 ASSAY OF LACTIC ACID: CPT

## 2018-06-12 PROCEDURE — 87086 URINE CULTURE/COLONY COUNT: CPT

## 2018-06-12 PROCEDURE — 85027 COMPLETE CBC AUTOMATED: CPT

## 2018-06-12 PROCEDURE — 82728 ASSAY OF FERRITIN: CPT

## 2018-06-12 PROCEDURE — 84466 ASSAY OF TRANSFERRIN: CPT

## 2018-06-12 PROCEDURE — 87040 BLOOD CULTURE FOR BACTERIA: CPT

## 2018-06-12 PROCEDURE — 36415 COLL VENOUS BLD VENIPUNCTURE: CPT

## 2018-06-12 PROCEDURE — 87486 CHLMYD PNEUM DNA AMP PROBE: CPT

## 2018-06-12 PROCEDURE — 87581 M.PNEUMON DNA AMP PROBE: CPT

## 2018-06-12 PROCEDURE — 97530 THERAPEUTIC ACTIVITIES: CPT

## 2018-06-12 PROCEDURE — 84100 ASSAY OF PHOSPHORUS: CPT

## 2018-06-12 PROCEDURE — 97162 PT EVAL MOD COMPLEX 30 MIN: CPT

## 2018-06-12 PROCEDURE — 99285 EMERGENCY DEPT VISIT HI MDM: CPT | Mod: 25

## 2018-06-12 PROCEDURE — 99233 SBSQ HOSP IP/OBS HIGH 50: CPT | Mod: GC

## 2018-06-12 PROCEDURE — 97116 GAIT TRAINING THERAPY: CPT

## 2018-06-12 PROCEDURE — 83550 IRON BINDING TEST: CPT

## 2018-06-12 PROCEDURE — 87798 DETECT AGENT NOS DNA AMP: CPT

## 2018-06-12 PROCEDURE — 84484 ASSAY OF TROPONIN QUANT: CPT

## 2018-06-12 PROCEDURE — 74177 CT ABD & PELVIS W/CONTRAST: CPT

## 2018-06-12 PROCEDURE — 83735 ASSAY OF MAGNESIUM: CPT

## 2018-06-12 PROCEDURE — 84443 ASSAY THYROID STIM HORMONE: CPT

## 2018-06-12 PROCEDURE — 82746 ASSAY OF FOLIC ACID SERUM: CPT

## 2018-06-12 PROCEDURE — 82607 VITAMIN B-12: CPT

## 2018-06-12 PROCEDURE — 85025 COMPLETE CBC W/AUTO DIFF WBC: CPT

## 2018-06-12 PROCEDURE — 81001 URINALYSIS AUTO W/SCOPE: CPT

## 2018-06-12 PROCEDURE — 87070 CULTURE OTHR SPECIMN AEROBIC: CPT

## 2018-06-12 PROCEDURE — 80048 BASIC METABOLIC PNL TOTAL CA: CPT

## 2018-06-12 PROCEDURE — 80053 COMPREHEN METABOLIC PANEL: CPT

## 2018-06-12 PROCEDURE — 70450 CT HEAD/BRAIN W/O DYE: CPT

## 2018-06-12 PROCEDURE — 87633 RESP VIRUS 12-25 TARGETS: CPT

## 2018-06-12 PROCEDURE — 93005 ELECTROCARDIOGRAM TRACING: CPT

## 2018-06-12 RX ADMIN — Medication 1 MILLIGRAM(S): at 11:14

## 2018-06-12 RX ADMIN — HEPARIN SODIUM 5000 UNIT(S): 5000 INJECTION INTRAVENOUS; SUBCUTANEOUS at 11:14

## 2018-06-12 RX ADMIN — HEPARIN SODIUM 5000 UNIT(S): 5000 INJECTION INTRAVENOUS; SUBCUTANEOUS at 03:37

## 2018-06-12 RX ADMIN — Medication 1 TABLET(S): at 11:14

## 2018-06-12 NOTE — PROGRESS NOTE ADULT - PROBLEM SELECTOR PLAN 1
Nutrition -meals and supplements
-S/P Banana bag, will hold further fluids at this time  -Patient receiving 1mg folate, 1 multivitamin, 500mg Thiamine for possible Wernicke, will reassess need  -TSH, Folate, B12, and RPR negative  -PT evaluation: ANIA
-S/P Banana bag, will hold further fluids at this time  -folate, multivitamin repletion  -TSH, Folate, B12, and RPR negative  -PT evaluation: ANIA  -Initial thought was patient had alcoholic related weakness, however, after further collateral obtained from outpatient PMD and patient family it appears patient quit drinking months ago. Unlikely to be contributory to overall weakness or current state. Weakness most likely 2/2 severe protein-calorie malnutrition 2/2 progressive dementia.
Patient had Oral temp of 101F, no rectal temp at that time. CXR shows no change from admission, large heart in AP window hard to see posterior portion of Left Lobe. UA appears negative. BCx NGTD.   -Will continue to follow up Culture results  -Most likely aspiration chemical pneumonitis.
-S/P Banana bag, will hold further fluids at this time  -Patient receiving 1mg folate, 1 multivitamin, 500mg Thiamine for possible Wernicke, will reassess need  -TSH, Folate, B12, and RPR all collected with AM labs will follow up results  -PT evaluation

## 2018-06-12 NOTE — PROGRESS NOTE ADULT - PROBLEM SELECTOR PLAN 5
Patient with normocytic anemia  -Iron labs consistent with anemia of chronic disease however patient without any signs  -As per admitting team patient without signs of hematochezia/melena during rectal   -Will continue to trend CBC

## 2018-06-12 NOTE — PROGRESS NOTE ADULT - ATTENDING COMMENTS
Patient was seen and examined by me at bedside. I agree with resident's note, subjective, objective physical exam, assessment and plan with following modifications/additions.
Patient was seen and examined by me at bedside. I agree with resident's note, subjective, objective physical exam, assessment and plan with following modifications/additions.
Patient was seen and examined by me at bedside. I agree with resident's note, subjective, objective physical exam, assessment and plan with following modifications/additions.     Unclear etiology of fever.  Current culture data is negative.  Umbilical hernia not acutely concerning for incarceration.

## 2018-06-12 NOTE — PROGRESS NOTE ADULT - ASSESSMENT
84M with HTN, HLD, dementia, and history of alcohol abuse who presents with progressively worsening weakness, lethargy and mental decline over the past month as per family. Patient shows mild cognitive decline on exam with diffuse muscle weakness. Could be Wernicke's but patient without visible nystagmus or visual symptoms. Will continue to follow up B12, TSH, and RPR to rule out other possible causes.
84M with HTN, HLD, dementia, and history of alcohol abuse who presents with progressively worsening weakness, lethargy and mental decline over the past month as per family. Patient shows mild cognitive decline on exam with diffuse muscle weakness. Patient has remained afebrile, still pending acceptance and insurance authorization for ANIA.
84M with HTN, HLD, dementia, and history of alcohol abuse who presents with progressively worsening weakness, lethargy and mental decline over the past month as per family. Patient shows mild cognitive decline on exam with diffuse muscle weakness. Patient has remained afebrile, still pending acceptance and insurance authorization for ANIA.     Problem/Plan - 1:  ·  Problem: Severe protein-calorie malnutrition.  Plan: -S/P Banana bag, will hold further fluids at this time  -folate, multivitamin repletion  -TSH, Folate, B12, and RPR negative  -PT evaluation: ANIA  -Initial thought was patient had alcoholic related weakness, however, after further collateral obtained from outpatient PMD and patient family it appears patient quit drinking months ago. Unlikely to be contributory to overall weakness or current state. Weakness most likely 2/2 severe protein-calorie malnutrition 2/2 progressive dementia.     Problem/Plan - 2:  ·  Problem: Fever, unspecified fever cause.  Plan: Patient had Oral temp of 101F, no rectal temp at that time. CXR shows no change from admission, large heart in AP window hard to see posterior portion of Left Lobe. UA appears negative. BCx NGTD. No new fever for >24h.   -Most likely aspiration chemical pneumonitis.     Problem/Plan - 3:  ·  Problem: Severe aortic stenosis by prior echocardiogram.  Plan: No intervention as per structural heart.     Problem/Plan - 4:  ·  Problem: Dementia.  Plan: Mild Cognitive Decline, but patient well studied and during interview took increasing time to arrive at answers. Extensive work up as an outpatient most likely a component of vascular and Alzheimer's  -Most likely underlying dementia given CTH findings of atrophic brain.
84M with HTN, HLD, dementia, and history of alcohol abuse who presents with progressively worsening weakness, lethargy and mental decline over the past month as per family. Patient shows mild cognitive decline on exam with diffuse muscle weakness. Spiked new fever overnight, appears stable on exam, no clear source identified at this time, possible sources include chemical pneumonitis vs aspiration, or ventral wall hernia. Will continue to monitor fever curve.
84M with HTN, HLD, dementia, and history of alcohol abuse who presents with progressively worsening weakness, lethargy and mental decline over the past month as per family. Patient shows mild cognitive decline on exam with diffuse muscle weakness. Could be Wernicke's but patient without visible nystagmus or visual symptoms. Will continue to follow up B12, TSH, and RPR to rule out other possible causes. Most likely just due to Failure to thrive at home and would benefit from PT evaluation for possible placement in rehab vs. home PT.

## 2018-06-12 NOTE — PROGRESS NOTE ADULT - SUBJECTIVE AND OBJECTIVE BOX
Physical Medicine and Rehabilitation Progress Note:    Patient is a 84y old  Male who presents with a chief complaint of Severe Protein Calorie Malnutrition 2/2 Progressive Dementia (11 Jun 2018 15:31)      HPI:  HPI: 85 y/o M PMHx HTN, HLD, dementia, presents with complaints of weakness. Per brother at bedside, patient has had dementia for 3 years and as of 1 year ago has been considerably more eccentric. 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 Patient has had 3 mechanical falls over the past 3 months, in January he slipped on ice and most recently 5/11 he slipped on the wet ground. He has previously been able to walk the dog, not really take care of himself in terms of ADLs, but his daughter lives 2 stories above him and patient lives on the 2nd floor of a walk-up. Since the most recent fall, patient has been afraid of walking outside in fear of falling, has been more odd per the brother such as having a conversation then trailing off and not making sense. He has been increasingly agitated as well and has been difficult to re-orient but seems to respond to the brother. Patient has also had worsening PO over the past month and needs to be encouraged to eat or drink anything. He mostly sleeps all day. Lost 30lbs over 6 weeks. He has been stooling himself and not bothering to clean himself or ask for help. Pt used to drink heavily per the brother, last drink was 3 weeks ago. Per his  doctor and psychiatrist, this is most likely progression of vascular dementia. He has been worked up extensively outpatient and including in our ED for various etiologies. Difficult to obtain ROS as patient is tangential and nonsensical despite following some commands. Per brother, does not know home medications was previously on statin, also was on 2 medications (possibly donepezil/memantine that was stopped 1 week ago), med rec also lists valsartan.    ED vitals:  T(F): 98.9, Max: 99.7  HR: 87 (78 - 89)  BP: 134/75 (112/81 - 148/75)  RR: 18 (18 - 20)  SpO2: 97% (96% - 98%)    Labs Hgb 11.2, previously 12.8 -> 12.5. Cr 1.23. UA negative. Blood culture from 2 days ago normal.  CXR no infiltrates or effusions. CT abdomen/pelvis w. IV contrast without acute etiologies.  NS 150cc/hr (07 Jun 2018 23:53)                            10.6   11.2  )-----------( 402      ( 12 Jun 2018 06:02 )             33.3       06-12    138  |  100  |  30<H>  ----------------------------<  128<H>  3.9   |  26  |  1.02    Ca    9.3      12 Jun 2018 06:03  Mg     2.4     06-12      Vital Signs Last 24 Hrs  T(C): 37.1 (12 Jun 2018 15:48), Max: 37.1 (12 Jun 2018 15:48)  T(F): 98.7 (12 Jun 2018 15:48), Max: 98.7 (12 Jun 2018 15:48)  HR: 89 (12 Jun 2018 15:48) (87 - 93)  BP: 143/77 (12 Jun 2018 15:48) (138/73 - 152/78)  BP(mean): --  RR: 18 (12 Jun 2018 15:48) (18 - 20)  SpO2: 96% (12 Jun 2018 15:48) (94% - 96%)    MEDICATIONS  (STANDING):  folic acid 1 milliGRAM(s) Oral daily  heparin  Injectable 5000 Unit(s) SubCutaneous every 8 hours  multivitamin 1 Tablet(s) Oral daily    MEDICATIONS  (PRN):    Currently Undergoing Physical Therapy at bedside.    Functional Status Assessment:    Previous Level of Function:     · Ambulation Skills	independent	  · Transfer Skills	independent	  · ADL Skills	independent	  · Additional Comments	Pt. lives alone in 2 story walk-up +26 y/o daughter lives 2 flights up +stairs to enter +unilateral handrail. Fell for the third time recently and has since been afraid to leave [facility]. Family states pt. soils bed, sleeps all day, has experienced significant weight loss, and daughter inadequate caretaker. Up until 3 weeks ago pt. independent with all bed mobilities, transfers, ADL's, and community ambulation without assistive device.	    Cognitive Status Examination:   · Orientation	person	  · Level of Consciousness	alert; lethargic/somnolent	  · Follows Commands and Answers Questions	100% of the time	  · Short Term Memory	intact	    Range of Motion Exam:   · Range of Motion Examination	bilateral lower extremity ROM was WFL (within functional limits); bilateral upper extremity ROM was WFL (within functional limits)	    Manual Muscle Testing:   · Manual Muscle Testing Results	grossly seen to be 3+/5 with functional assessment of bed mobilities, transfers, and ambulation	    Muscle Tone Assessment:   · Muscle Tone Assessment	bilateral lower extremities; bilateral upper extremities; normal	    Bed Mobility: Rolling/Turning:     · Level of Gaines	minimum assist (75% patients effort)	  · Physical Assist/Nonphysical Assist	1 person assist; set-up required; verbal cues	    Bed Mobility: Scooting/Bridging:     · Level of Gaines	minimum assist (75% patients effort)	  · Physical Assist/Nonphysical Assist	1 person assist; set-up required; verbal cues	    Bed Mobility: Sit to Supine:     · Level of Gaines	minimum assist (75% patients effort)	  · Physical Assist/Nonphysical Assist	1 person assist; set-up required; verbal cues	    Bed Mobility: Supine to Sit:     · Level of Gaines	minimum assist (75% patients effort)	  · Physical Assist/Nonphysical Assist	1 person assist; set-up required	    Bed Mobility Analysis:     · Bed Mobility Limitations	decreased ability to use arms for pushing/pulling; decreased ability to use legs for bridging/pushing; impaired ability to control trunk for mobility	  · Impairments Contributing to Impaired Bed Mobility	decreased strength; impaired postural control; cognition	    Transfer: Sit to Stand:     · Level of Gaines	minimum assist (75% patients effort)	  · Physical Assist/Nonphysical Assist	verbal cues; 1 person assist; set-up required	  · Assistive Device	rolling walker	    Transfer: Stand to Sit:     · Level of Gaines	minimum assist (75% patients effort)	  · Physical Assist/Nonphysical Assist	1 person assist; set-up required; verbal cues	  · Assistive Device	rolling walker	    Sit/Stand Transfer Safety Analysis:     · Transfer Safety Concerns Noted	decreased safety awareness; losing balance; decreased step length; stepping too close to front of assistive device; decreased weight-shifting ability	  · Impairments Contributing to Impaired Transfers	impaired balance; cognition; decreased strength	    Gait Skills:     · Level of Gaines	minimum assist (75% patients effort)	  · Physical Assist/Nonphysical Assist	1 person assist; set-up required; verbal cues; 1 person + 1 person to manage equipment; 1 person for chair follow	  · Assistive Device	rolling walker	  · Gait Distance	50 feet	    Gait Analysis:     · Gait Pattern Used	3-point gait	  · Gait Deviations Noted	decreased dee; decreased step length; decreased weight-shifting ability; decreased stride length; increased time in double stance	  · Impairments Contributing to Gait Deviations	cognition; impaired balance; decreased strength; impaired postural control	    Balance Skills Assessment:     · Sitting Balance: Static	good balance	  · Sitting Balance: Dynamic	fair balance	  · Sit-to-Stand Balance	fair balance	  · Standing Balance: Static	fair balance	  · Standing Balance: Dynamic	fair minus	  · Systems Impairment Contributing to Balance Disturbance	musculoskeletal; cognitive	  · Identified Impairments Contributing to Balance Disturbance	decreased strength; impaired postural control	    Sensory Examination:   Sensory Examination:    Grossly Intact:   · Gross Sensory Examination	Grossly Intact	      Light Touch Sensation:   · Left LE	within normal limits	  · Right LE	within normal limits	      Treatment Location:   · Comments	FIM Locomotion: 2; FIM Stair Negotiation: TBA	    Clinical Impressions:   · Criteria for Skilled Therapeutic Interventions	impairments found; functional limitations in following categories; risk reduction/prevention; rehab potential; therapy frequency; anticipated discharge recommendation; anticipated equipment needs at discharge	  · Impairments Found (describe specific impairments)	aerobic capacity/endurance; cognitive impairment; arousal, attention, and cognition; gait, locomotion, and balance; muscle strength; posture; ROM; poor safety awareness	  · Functional Limitations in Following Categories (describe specific limitations)	self-care; home management; community/leisure	  · Risk Reduction/Prevention (Describe Specific Areas of risk reduction/prevention)	risk factors	  · Risk Areas	fall; impaired judgment; cognitive impairment	  · Rehab Potential	good, to achieve stated therapy goals	  · Therapy Frequency	3-5x/week	  · Anticipated Equipment Needs at Discharge	rolling walker (5 inch wheels)	        Reassessment on 6/11/2018      Pain Assessment    Pain Assessment/Number Scale (0-10) Adult  Presence of Pain: non-verbal indicator of pain/discomfort present  Pain Body Location: Unable to communicate location  Nonverbal Indicators of Pain: moaning;  squirming;  eyes open wide    Safety      Safety Interventions  All Alarms: alarm(s) activated and audible    Fall/Harm Risk Assessment  1a. Does this patient need assistance with standing, walking, or toileting?: yes  Needs assist with: standing   walking   toileting  1b. Attempt to get out of bed/chair unassisted when assistance is needed?: no  2. Has the patient fallen in the last 6 months or during this admission?: yes   When did fall occur?: last six months    Cognitive/Neuro      Cognitive/Perceptual/Neuro  Level of Consciousness: confused;  lethargic  Arousal Level: arouses to voice;  arouses to touch/gentle shaking;  arouses to pain  Orientation: disoriented to;  place  Speech: slurred  Mood/Behavior: Appeared anxious and uncomfortable     Therapeutic Interventions      Bed Mobility  Bed Mobility Training Rolling/Turning: moderate assist (50% patient effort);  2 person assist;  set-up required;  verbal cues  Bed Mobility Training Scooting: moderate assist (50% patient effort);  2 person assist;  set-up required;  verbal cues  Bed Mobility Training Bridging: moderate assist (50% patient effort);  2 person assist;  set-up required;  verbal cues  Bed Mobility Training Sit-to-Supine: moderate assist (50% patient effort);  2 person assist;  set-up required;  verbal cues  Bed Mobility Training Supine-to-Sit: moderate assist (50% patient effort);  2 person assist;  set-up required;  verbal cues  Bed Mobility Training Limitations: decreased ability to use arms for pushing/pulling;  decreased ability to use legs for bridging/pushing;  impaired ability to control trunk for mobility;  decreased strength;  impaired balance;  impaired motor control;  pain;  cognitive, decreased safety awareness    Sit-Stand Transfer Training  Transfer Training Sit-to-Stand Transfer: moderate assist (50% patient effort);  2 person assist;  set-up required;  verbal cues;  full weight-bearing   rolling walker  Transfer Training Stand-to-Sit Transfer: moderate assist (50% patient effort);  2 person assist;  set-up required;  verbal cues;  full weight-bearing   rolling walker  Sit-to-Stand Transfer Training Transfer Safety Analysis: decreased balance;  decreased cognition;  decreased step length;  decreased weight-shifting ability;  decreased strength;  impaired balance;  impaired postural control;  pain;  cognitive, decreased safety awareness;  impaired coordination;  rolling walker    Gait Training  Gait Training: maximum assist (25% patient effort);  2 person assist;  full weight-bearing   rolling walker;  3 side steps  Gait Analysis: 3-point gait   decreased dee;  crouch;  Cues to stand up straight and look up;  increased time in double stance;  decreased hip/knee flexion;  decreased step length;  decreased weight-shifting ability;  decreased toe clearance;  shuffling;  decreased ROM;  decreased strength;  impaired balance;  impaired coordination;  impaired postural control;  impaired motor control;  pain;  cognitive, decreased safety awareness;  3 side steps;  rolling walker    Therapeutic Exercise  Therapeutic Exercise Detail: -Bilateral supine ankle pumps [id number] seated Knee extension x5-Bilateral seated Knee extension PROM-Decreased coordination seen with inability to perform heel to shin + tremulousness seen with finger to nose (LUE > RUE)-Negative clonus & babinski-Able to smile, stick out tongue, say ah, and track finger in horizontal and vertical planes           PM&R Impression: as above    Disposition Plan Recommendations:  subacute rehab placement

## 2018-06-12 NOTE — PROGRESS NOTE ADULT - PROBLEM SELECTOR PLAN 9
P: 5000U HSQ q8h  C: DNR/DNI  D: Pending [facility] facility
P: 5000U HSQ q8h  C: FULL CODE  D: Pending [facility] facility

## 2018-06-12 NOTE — PROGRESS NOTE ADULT - PROBLEM SELECTOR PLAN 2
Patient had Oral temp of 101F, no rectal temp at that time. CXR shows no change from admission, large heart in AP window hard to see posterior portion of Left Lobe. UA appears negative. BCx NGTD. No new fever for >24h.   -Most likely aspiration chemical pneumonitis.

## 2018-06-12 NOTE — PROGRESS NOTE ADULT - SUBJECTIVE AND OBJECTIVE BOX
O/N Events: RANJIT  Subjective/ROS: Patient has no complaints. Denies HA, CP, SOB, n/v, changes in bowel/urinary habits.  12pt ROS otherwise negative.    VITALS  Vital Signs Last 24 Hrs  T(C): 36.9 (2018 08:56), Max: 37.3 (2018 09:25)  T(F): 98.5 (2018 08:56), Max: 99.1 (2018 09:25)  HR: 90 (2018 08:56) (87 - 96)  BP: 146/78 (2018 08:56) (126/73 - 152/78)  BP(mean): --  RR: 20 (2018 08:56) (18 - 20)  SpO2: 94% (2018 08:56) (91% - 96%)    CAPILLARY BLOOD GLUCOSE    PHYSICAL EXAM  General: A&Ox2; NAD  Head: NC/AT; MMM; PERRL; EOMI;  Neck: Supple; no JVD  Respiratory: CTA B/L; no wheezes/crackles   Cardiovascular: Regular rhythm/rate; S1/S2; 3/6 Systolic Murmur RUSB   Gastrointestinal: Soft; NTND; normoactive BS; umbilical hernia  Extremities: WWP; no edema/cyanosis  Neurological:  CNII-XII grossly intact; no obvious focal deficits    MEDICATIONS  (STANDING):  folic acid 1 milliGRAM(s) Oral daily  heparin  Injectable 5000 Unit(s) SubCutaneous every 8 hours  multivitamin 1 Tablet(s) Oral daily    MEDICATIONS  (PRN):      No Known Allergies      LABS                        10.6   11.2  )-----------( 402      ( 2018 06:02 )             33.3     06-12    138  |  100  |  30<H>  ----------------------------<  128<H>  3.9   |  26  |  1.02    Ca    9.3      2018 06:03  Mg     2.4     06-12        Urinalysis Basic - ( 2018 04:02 )    Color: Yellow / Appearance: SL Cloudy / S.015 / pH: x  Gluc: x / Ketone: NEGATIVE  / Bili: Negative / Urobili: 0.2 E.U./dL   Blood: x / Protein: NEGATIVE mg/dL / Nitrite: NEGATIVE   Leuk Esterase: NEGATIVE / RBC: x / WBC x   Sq Epi: x / Non Sq Epi: x / Bacteria: x

## 2018-06-13 LAB
CULTURE RESULTS: SIGNIFICANT CHANGE UP
SPECIMEN SOURCE: SIGNIFICANT CHANGE UP

## 2018-06-15 DIAGNOSIS — F10.10 ALCOHOL ABUSE, UNCOMPLICATED: ICD-10-CM

## 2018-06-15 DIAGNOSIS — G92 TOXIC ENCEPHALOPATHY: ICD-10-CM

## 2018-06-15 DIAGNOSIS — Z86.73 PERSONAL HISTORY OF TRANSIENT ISCHEMIC ATTACK (TIA), AND CEREBRAL INFARCTION WITHOUT RESIDUAL DEFICITS: ICD-10-CM

## 2018-06-15 DIAGNOSIS — G30.9 ALZHEIMER'S DISEASE, UNSPECIFIED: ICD-10-CM

## 2018-06-15 DIAGNOSIS — E43 UNSPECIFIED SEVERE PROTEIN-CALORIE MALNUTRITION: ICD-10-CM

## 2018-06-15 DIAGNOSIS — J69.0 PNEUMONITIS DUE TO INHALATION OF FOOD AND VOMIT: ICD-10-CM

## 2018-06-15 DIAGNOSIS — K42.9 UMBILICAL HERNIA WITHOUT OBSTRUCTION OR GANGRENE: ICD-10-CM

## 2018-06-15 DIAGNOSIS — I35.0 NONRHEUMATIC AORTIC (VALVE) STENOSIS: ICD-10-CM

## 2018-06-15 DIAGNOSIS — D64.9 ANEMIA, UNSPECIFIED: ICD-10-CM

## 2018-06-15 DIAGNOSIS — I48.91 UNSPECIFIED ATRIAL FIBRILLATION: ICD-10-CM

## 2018-06-15 DIAGNOSIS — F02.80 DEMENTIA IN OTHER DISEASES CLASSIFIED ELSEWHERE, UNSPECIFIED SEVERITY, WITHOUT BEHAVIORAL DISTURBANCE, PSYCHOTIC DISTURBANCE, MOOD DISTURBANCE, AND ANXIETY: ICD-10-CM

## 2018-06-15 DIAGNOSIS — R53.1 WEAKNESS: ICD-10-CM

## 2018-06-15 DIAGNOSIS — F01.50 VASCULAR DEMENTIA WITHOUT BEHAVIORAL DISTURBANCE: ICD-10-CM

## 2018-06-15 DIAGNOSIS — I10 ESSENTIAL (PRIMARY) HYPERTENSION: ICD-10-CM

## 2018-06-15 DIAGNOSIS — E78.5 HYPERLIPIDEMIA, UNSPECIFIED: ICD-10-CM

## 2018-06-15 PROBLEM — Z00.00 ENCOUNTER FOR PREVENTIVE HEALTH EXAMINATION: Status: ACTIVE | Noted: 2018-06-15

## 2018-06-16 LAB
CULTURE RESULTS: SIGNIFICANT CHANGE UP
CULTURE RESULTS: SIGNIFICANT CHANGE UP
SPECIMEN SOURCE: SIGNIFICANT CHANGE UP
SPECIMEN SOURCE: SIGNIFICANT CHANGE UP

## 2018-11-08 NOTE — DIETITIAN INITIAL EVALUATION ADULT. - PROBLEM SELECTOR PROBLEM 2
The pt was pleasant and cooperative and eager to ambulate with PT, the pt is anticipating discharge from  soon.
Anemia

## 2019-01-14 NOTE — PROGRESS NOTE ADULT - PROBLEM SELECTOR PLAN 4
Phimosis Mild Cognitive Decline, but patient well studied and during interview took increasing time to arrive at answers. Extensive work up as an outpatient most likely a component of vascular and Alzheimer's  -Most likely underlying dementia given CTH findings of atrophic brain

## 2019-08-19 NOTE — ED ADULT NURSE NOTE - FALL HARM RISK CONCLUSION
[facility] Consult Note   Date of Consult: August 19, 2019  Reason for Consult: GI bleed    History of Present Illness: This is a 79-year-old male with a history of vocal cord cancer status post radiation, throat cancer status post biopsy left parotid mass in 4/2019, AAA, CAD, MI, cardiac stent placement, history of colon polyps, atrial fibrillation started on Coumadin 8 weeks ago presents to the ED with black stools.  Patient reports he underwent CT scan with contrast on Thursday, 8/15/2019 for follow-up imaging on AAA stated he was  advised to drink lots of fluid to flush out the contrast dye.  He reported he developed black tarry stool x1 episode the following day on Friday after drinking lots of fluid and eating 2 quarts of red Jell-O.  He then noted to have another episode of black tarry stool x1 on Sunday.  Patient called the ER and was advised to come to the hospital for further evaluation.  Patient stated his last dose of Coumadin and aspirin was Saturday at 9 AM.   He denies having history of black stools previously.  Patient states he believe the black stool is due to the Jell-O.  Patient does have intermittent minor rectal bleeding of bright red blood on toilet paper secondary to hemorrhoids for over 25 years.  He denies fever, chills, nausea, vomiting, abdominal pain, diarrhea, or constipation.  Patient reports losing 20 pounds in 1 year from dieting.  His appetite has been fair.  Patient denies taking  NSAIDs, iron p.o., or Pepto-Bismol.  On arrival, his hemoglobin was 12.6.  BUN 38, creatinine 1.42, INR 2.0.  His OB stool was positive the ED.  Patient has not had further episodes of melena since admission.  Past Medical History: History of vocal cord cancer status post radiation, throat cancer status post biopsy left parotid mass in 4/2019, AAA, CAD, MI, history of colon polyps, atrial fibrillation started on Coumadin 8 weeks ago, hemorrhoids  Past Surgical History: Cardiac stent  placement, biopsy of left parotid mass in April 2019, no previous EGD.  Colonoscopy in 5/2003 showed hemorrhoids; Colonoscopy in 5/2016 showed diverticuli, 2 small lipomas in the proximal ascending, diminutive polyp in the rectum, submucosal lesion in the rectum, and large internal hemorrhoids  Family History: Denies family history is of GI malignancies or diseases  Social History: Denies tobacco, alcohol, or drug use  Allergies: Allergies (1) Active Reaction  NKA None Documented    Medications: Medication Reconciliation reviewed     Review of Systems:   Constitutional: denies fevers or change in appetite. +weight loss with dieting  HEENT: denies changes in vision or hearing   Respiratory: denies shortness of breath, wheezing, or cough   Cardiovascular: denies chest pain, palpitations, or dizziness   Gastrointestinal: see HPI   Genitourinary: denies dysuria or hematuria   Musculoskeletal: denies joint aches, pain, or swelling   Skin: denies jaundice, rashes, or lesions   Neurological: denies numbness or tingling   Psychiatric: denies anxiety or depression   Heme/lymph: black stools     Physical Examination:   General: well developed, elderly male, in no acute distress  Vital signs: Vital Signs (last 24 hrs)_____ Last Charted___________Minimum____________ Maximum____________  Temp    L 97.4 (AUG 19 05:25) L 97.4 (AUG 19 05:25) 98.4  (AUG 18 15:03)  Heart Rate   76  (AUG 19 05:25) 76  (AUG 19 05:25) H 116 (AUG 18 15:03)  Resp Rate       20  (AUG 19 05:25) 14  (AUG 18 14:06) 20  (AUG 18 19:38)  SBP    102  (AUG 19 05:25) 98  (AUG 18 19:38) 129  (AUG 18 14:06)  DBP    64  (AUG 19 05:25) 64  (AUG 18 19:38) 85  (AUG 18 12:13)    Eyes: non icteric, pink conjunctiva   ENT: no oral lesions, moist mucous membranes   Neck: supple, no lymphadenopathy   Respiratory: lungs clear to auscultation, cough absent   Cardiovascular: S1/S2, regular rate and rhythm  Abdomen: soft, obese,  non distended, non tender; bowel sounds present   Rectal: Melena present on digital rectal exam  Musculoskeletal: muscle mass appropriate for age   Skin: no jaundice, rashes or lesions   Psychiatric: alert and oriented x3, appropriate     Radiology: No new GI imaging  Labs: Labs (Last four charted values)  WBC                  7.2 (AUG 19) 8.0 (AUG 18)   Hgb                  L 10.9 (AUG 19) L 11.1 (AUG 19) L 11.2 (AUG 18) 12.6 (AUG 18)  Hct                  L 34 (AUG 19) L 34 (AUG 19) L 34 (AUG 18) 38 (AUG 18)  Plt                  170 (AUG 19) 200 (AUG 18)   Na                   137 (AUG 19) 138 (AUG 18)   K                    4.7 (AUG 19) 4.9 (AUG 18)   CO2                  25 (AUG 19) 25 (AUG 18)   Cl                   H 108 (AUG 19) 106 (AUG 18)   Cr                   1.21 (AUG 19) H 1.42 (AUG 18)   BUN                  H 32 (AUG 19) H 38 (AUG 18)   Glucose              H 100 (AUG 19) 98 (AUG 18)   Ca                   9.0 (AUG 19) 9.4 (AUG 18)   PT                   H 15.9 (AUG 19) H 19.2 (AUG 18)   INR                  H 1.6 (AUG 19) H 2.0 (AUG 18)   PTT                  H 32 (AUG 18)     Assessment:   1.  Melena and Anemia   This is a 79-year-old male with a history of vocal cord cancer status post radiation, throat cancer status post biopsy left parotid mass in 4/2019, AAA, CAD, MI, cardiac stent placement, history of colon polyps, atrial fibrillation started on Coumadin 8 weeks ago presents to the ED with black stools.  Per chart review, patient's hemoglobin has been within normal limits prior to his hospitalization.  His hemoglobin was as high as 15.4 on  February 2018.  On arrival, his hemoglobin was 12.6 and has down trended to 10.9 today.  His OB stool was positive in the ED.  Melena was present on digital rectal exam.  Patient has no further episodes of melena since admission.  Etiology of his symptom is unclear however likely related to upper GI bleed such as ulcers, AVMs, lesions, erosions etc. exacerbated by anticoagulation therapy vs other causes.   His last dose of Coumadin and aspirin  was on Saturday morning on 8/17/2019.  Patient with no previous history of EGD.  Patient will likely benefit from EGD for further evaluation.  Patient is convinced his black stools is related to eating 2 quarts of red Jell-O and defers endoscopic evaluation at this time. Timing of endoscopic evaluation to be determined. We will keep patient n.p.o., continue PPI drip, monitor H&H trend, and signs for overt GI bleeding.  2.  History of colon polyps  Colonoscopy in 5/2016 showed diverticuli, 2 small lipomas in the proximal ascending, diminutive polyp in the rectum, submucosal lesion in the rectum, and large internal hemorrhoids  Plan:   N.p.o.  IV fluids  Continue PPI drip  Monitor H&H trend and signs for overt GI bleeding  Timing of endoscopic evaluation to be determined  We will follow  We will discuss with Dr. Alanna Brumfield, JOSEPH-C, [facility]     Thank you for allowing us to participate in the patient's care.  Please do not hesitate to call us with any questions or concerns.   Fall with Harm Risk

## 2022-02-07 NOTE — ED ADULT NURSE NOTE - EXTENSIONS OF SELF_ADULT
[facility]  EMERGENCY [facility]IC[facility] ATTENDING ATTESTATION      Evaluation of Rosa Castano. Case discussed and care plan developed with resident physician. I agree with the resident physician documentation and plan as documented by her, except if my documentation differs. Patient seen, interviewed and examined by me. I reviewed the medical, surgical, family and social history, medications and allergies. I have reviewed the nursing documentation. I have reviewed the patient's vital signs and are abnormal from Tachycardia, hypertension per my interpretation. Body mass index is 35.95 kg/m². Pulsoxymetry is normal per my interpretation. Brief H&P   Patient urgently complaining of feeling his ICD shocking 5 times in the last hour and a half. On further interview patient states he has been on a drinking binge for 5 days including today and that last Saturday, 2 days ago he slipped and fell on ice, hitting the right side of his body. Physical exam is notable for well appearing obese male, ICD palpable on left upper chest.  There are multiple reasons but not new appearing bruises to his right upper extremity from the mid arm to the wrist, large bruise on the right side of the chest, small bruise on the anterior left knee. Medical Decision Making   MDM:   1. ICD shocks today  2. Rule out ACS  3. A. fib with rapid ventricular response  4. Acute alcohol intoxication with history of alcohol use  5. Recent fall with injuries to the right upper extremity, left lower extremity and right chest  Plan:    I V line, labs   EKG   IV fluids   Imaging   Will need medication for rate control   Observation   Patient may need to be admitted    Please see the resident physician completed note for final disposition except as documented on this attestation. I have reviewed and interpreted all available lab, radiology and ekg results available at the moment.   Diagnosis, treatment and disposition plans were discussed and agreed upon by patient. This transcription was electronically signed. It was dictated by use of voice recognition software and electronically transcribed. The transcription may contain errors not detected in proofreading. I performed direct supervision and was present for the critical portion following procedures: None  Critical care time on this case: See critical care addendum below      CRITICAL CARE ADDENDUM:  This patient was identified as critically ill on my evaluation due to atrial fibrillation with rapid ventricular response after recent trauma and alcohol binge, requiring medication for rate control, vital signs stabilization. There is a high probability of imminent or life-threatening deterioration in the patients condition. A total time of 45 minutes has been spent by me providing critical care to this patient, excluding separately billable procedures. I personally supervised all procedures and actions performed on this patient. I agree with the resident physician's assessment and plan of care except as modified by me or on my addendum.       Electronically signed by Radha Bahena MD on 2/7/22 at 1:35 PM MANNIE Bahena MD  02/07/22 8144 None

## 2022-12-21 NOTE — DIETITIAN INITIAL EVALUATION ADULT. - PHYSICAL APPEARANCE
MD Erin at bedside. Assessed pt.  Per MD Erin - okay to proceed with procedure.        VSS, all questions answered. Denies recent fever or illness. Pt states ready for procedure.    well nourished

## 2022-12-21 NOTE — ED ADULT NURSE NOTE - FACIAL SYMMETRY
Medical Necessity Information: It is in your best interest to select a reason for this procedure from the list below. All of these items fulfill various CMS LCD requirements except the new and changing color options.
Include Z78.9 (Other Specified Conditions Influencing Health Status) As An Associated Diagnosis?: No
Medical Necessity Clause: This procedure was medically necessary because the lesion that was treated was:
Detail Level: Detailed
Size Of Lesion In Cm: 1
X Size Of Lesion In Cm (Optional): 0
Anesthesia Type: 1% lidocaine with epinephrine
Hemostasis: Aluminum Chloride
Wound Care: Petrolatum
Consent was obtained from the patient. The risks and benefits to therapy were discussed in detail. Specifically, the risks of infection, scarring, bleeding, prolonged wound healing, incomplete removal, allergy to anesthesia, nerve injury and recurrence were addressed. Prior to the procedure, the treatment site was clearly identified and confirmed by the patient. All components of Universal Protocol/PAUSE Rule completed.
Post-Care Instructions: I reviewed with the patient in detail post-care instructions. Patient is to keep the biopsy site dry overnight, and then apply bacitracin twice daily until healed. Patient may apply hydrogen peroxide soaks to remove any crusting.
Size Of Lesion In Cm: 0.5
symmetrical